# Patient Record
Sex: MALE | Race: WHITE | Employment: OTHER | URBAN - METROPOLITAN AREA
[De-identification: names, ages, dates, MRNs, and addresses within clinical notes are randomized per-mention and may not be internally consistent; named-entity substitution may affect disease eponyms.]

---

## 2022-01-19 ENCOUNTER — TELEPHONE (OUTPATIENT)
Dept: GASTROENTEROLOGY | Age: 82
End: 2022-01-19

## 2022-01-19 ENCOUNTER — HOSPITAL ENCOUNTER (INPATIENT)
Age: 82
LOS: 5 days | Discharge: HOME | DRG: 372 | End: 2022-01-25
Attending: EMERGENCY MEDICINE | Admitting: INTERNAL MEDICINE
Payer: MEDICARE

## 2022-01-19 DIAGNOSIS — A04.72 C. DIFFICILE DIARRHEA: Primary | ICD-10-CM

## 2022-01-19 LAB
ANION GAP SERPL CALCULATED.3IONS-SCNC: 7 MMOL/L (ref 3–16)
ANISOCYTOSIS: ABNORMAL
ATYPICAL LYMPHOCYTE RELATIVE PERCENT: 7 % (ref 0–6)
BANDED NEUTROPHILS RELATIVE PERCENT: 3 % (ref 0–7)
BASOPHILS ABSOLUTE: 0.2 K/UL (ref 0–0.2)
BASOPHILS RELATIVE PERCENT: 1 %
BUN BLDV-MCNC: 28 MG/DL (ref 7–20)
BURR CELLS: ABNORMAL
CALCIUM SERPL-MCNC: 8.8 MG/DL (ref 8.3–10.6)
CHLORIDE BLD-SCNC: 112 MMOL/L (ref 99–110)
CO2: 23 MMOL/L (ref 21–32)
CREAT SERPL-MCNC: 1.6 MG/DL (ref 0.8–1.3)
EOSINOPHILS ABSOLUTE: 0 K/UL (ref 0–0.6)
EOSINOPHILS RELATIVE PERCENT: 0 %
GFR AFRICAN AMERICAN: 51
GFR NON-AFRICAN AMERICAN: 42
GLUCOSE BLD-MCNC: 81 MG/DL (ref 70–99)
HCT VFR BLD CALC: 37.9 % (ref 40.5–52.5)
HEMOGLOBIN: 11.9 G/DL (ref 13.5–17.5)
LYMPHOCYTES ABSOLUTE: 9.7 K/UL (ref 1–5.1)
LYMPHOCYTES RELATIVE PERCENT: 47 %
MACROCYTES: ABNORMAL
MCH RBC QN AUTO: 28.7 PG (ref 26–34)
MCHC RBC AUTO-ENTMCNC: 31.4 G/DL (ref 31–36)
MCV RBC AUTO: 91.3 FL (ref 80–100)
MICROCYTES: ABNORMAL
MONOCYTES ABSOLUTE: 0.9 K/UL (ref 0–1.3)
MONOCYTES RELATIVE PERCENT: 5 %
NEUTROPHILS ABSOLUTE: 7.2 K/UL (ref 1.7–7.7)
NEUTROPHILS RELATIVE PERCENT: 37 %
OVALOCYTES: ABNORMAL
PDW BLD-RTO: 18.4 % (ref 12.4–15.4)
PLATELET # BLD: 167 K/UL (ref 135–450)
PMV BLD AUTO: 8.4 FL (ref 5–10.5)
POIKILOCYTES: ABNORMAL
POLYCHROMASIA: ABNORMAL
POTASSIUM REFLEX MAGNESIUM: 5 MMOL/L (ref 3.5–5.1)
RBC # BLD: 4.15 M/UL (ref 4.2–5.9)
SMUDGE CELLS: PRESENT
SODIUM BLD-SCNC: 142 MMOL/L (ref 136–145)
WBC # BLD: 18 K/UL (ref 4–11)

## 2022-01-19 PROCEDURE — 99284 EMERGENCY DEPT VISIT MOD MDM: CPT

## 2022-01-19 PROCEDURE — 80048 BASIC METABOLIC PNL TOTAL CA: CPT

## 2022-01-19 PROCEDURE — 85025 COMPLETE CBC W/AUTO DIFF WBC: CPT

## 2022-01-19 PROCEDURE — 2580000003 HC RX 258: Performed by: PHYSICIAN ASSISTANT

## 2022-01-19 PROCEDURE — 36415 COLL VENOUS BLD VENIPUNCTURE: CPT

## 2022-01-19 PROCEDURE — 80076 HEPATIC FUNCTION PANEL: CPT

## 2022-01-19 PROCEDURE — 83690 ASSAY OF LIPASE: CPT

## 2022-01-19 RX ORDER — SODIUM CHLORIDE, SODIUM LACTATE, POTASSIUM CHLORIDE, AND CALCIUM CHLORIDE .6; .31; .03; .02 G/100ML; G/100ML; G/100ML; G/100ML
1000 INJECTION, SOLUTION INTRAVENOUS ONCE
Status: COMPLETED | OUTPATIENT
Start: 2022-01-19 | End: 2022-01-20

## 2022-01-19 RX ADMIN — SODIUM CHLORIDE, POTASSIUM CHLORIDE, SODIUM LACTATE AND CALCIUM CHLORIDE 1000 ML: 600; 310; 30; 20 INJECTION, SOLUTION INTRAVENOUS at 23:55

## 2022-01-19 ASSESSMENT — ENCOUNTER SYMPTOMS
NAUSEA: 0
SHORTNESS OF BREATH: 0
ABDOMINAL PAIN: 0
DIARRHEA: 1
RECTAL PAIN: 1
ANAL BLEEDING: 0
VOMITING: 0

## 2022-01-19 NOTE — TELEPHONE ENCOUNTER
This is a 70-year-old man with history of mantle cell lymphoma, GERD, bile salt diarrhea who is suffering from recurrent C. difficile colitis. In the fall, he was having some worsening diarrhea that responded to cholestyramine. However, diarrhea worsened and he was found to be C. difficile PCR positive but EIA negative. We then tried to treat him with 2 cycles of vancomycin, but he had trouble with the 4 times a day dosing. His most recent course of vancomycin ended about 2 weeks ago, and he took only 1 week of 500 mg 4 times a day by accident. Since finishing the vancomycin, the diarrhea certainly worsened and now he is having regular incontinence. I saw him in the office last Friday and he was very discouraged by his incontinence. He went for C. difficile testing at [facility] and was found to be positive by PCR and EIA for C. difficile. I called him this evening to recommend treatment with Dificid and to push the IV fluids but he thinks that he cannot stay home at this point. He reports profuse diarrhea and thinks he is dehydrated and does not think he can keep up with fluids. He wants to go to the ER. Therefore, I recommended he go to [facility] ER.   I did call er to

## 2022-01-20 PROBLEM — A04.71 RECURRENT CLOSTRIDIOIDES DIFFICILE DIARRHEA: Status: ACTIVE | Noted: 2022-01-20

## 2022-01-20 PROBLEM — D72.829 LEUKOCYTOSIS: Status: ACTIVE | Noted: 2022-01-20

## 2022-01-20 PROBLEM — K52.9 COLITIS: Status: ACTIVE | Noted: 2022-01-20

## 2022-01-20 PROBLEM — N17.9 AKI (ACUTE KIDNEY INJURY) (HCC): Status: ACTIVE | Noted: 2022-01-20

## 2022-01-20 LAB
ALBUMIN SERPL-MCNC: 2.3 G/DL (ref 3.4–5)
ALP BLD-CCNC: 94 U/L (ref 40–129)
ALT SERPL-CCNC: 21 U/L (ref 10–40)
ANION GAP SERPL CALCULATED.3IONS-SCNC: 6 MMOL/L (ref 3–16)
AST SERPL-CCNC: 53 U/L (ref 15–37)
BILIRUB SERPL-MCNC: <0.2 MG/DL (ref 0–1)
BILIRUBIN DIRECT: <0.2 MG/DL (ref 0–0.3)
BILIRUBIN, INDIRECT: ABNORMAL MG/DL (ref 0–1)
BUN BLDV-MCNC: 29 MG/DL (ref 7–20)
CALCIUM SERPL-MCNC: 8.6 MG/DL (ref 8.3–10.6)
CHLORIDE BLD-SCNC: 112 MMOL/L (ref 99–110)
CO2: 26 MMOL/L (ref 21–32)
CREAT SERPL-MCNC: 1.7 MG/DL (ref 0.8–1.3)
GFR AFRICAN AMERICAN: 47
GFR NON-AFRICAN AMERICAN: 39
GLUCOSE BLD-MCNC: 109 MG/DL (ref 70–99)
LIPASE: 12 U/L (ref 13–60)
MAGNESIUM: 1.6 MG/DL (ref 1.8–2.4)
POTASSIUM SERPL-SCNC: 4.1 MMOL/L (ref 3.5–5.1)
SODIUM BLD-SCNC: 144 MMOL/L (ref 136–145)
TOTAL PROTEIN: 5.7 G/DL (ref 6.4–8.2)

## 2022-01-20 PROCEDURE — 83735 ASSAY OF MAGNESIUM: CPT

## 2022-01-20 PROCEDURE — 6370000000 HC RX 637 (ALT 250 FOR IP): Performed by: INTERNAL MEDICINE

## 2022-01-20 PROCEDURE — 80048 BASIC METABOLIC PNL TOTAL CA: CPT

## 2022-01-20 PROCEDURE — 1200000000 HC SEMI PRIVATE

## 2022-01-20 PROCEDURE — 6360000002 HC RX W HCPCS: Performed by: INTERNAL MEDICINE

## 2022-01-20 PROCEDURE — 2580000003 HC RX 258: Performed by: INTERNAL MEDICINE

## 2022-01-20 PROCEDURE — 36415 COLL VENOUS BLD VENIPUNCTURE: CPT

## 2022-01-20 PROCEDURE — 6370000000 HC RX 637 (ALT 250 FOR IP): Performed by: PHYSICIAN ASSISTANT

## 2022-01-20 PROCEDURE — 99223 1ST HOSP IP/OBS HIGH 75: CPT | Performed by: INTERNAL MEDICINE

## 2022-01-20 RX ORDER — SODIUM CHLORIDE 9 MG/ML
25 INJECTION, SOLUTION INTRAVENOUS PRN
Status: DISCONTINUED | OUTPATIENT
Start: 2022-01-20 | End: 2022-01-25 | Stop reason: HOSPADM

## 2022-01-20 RX ORDER — ALLOPURINOL 300 MG/1
300 TABLET ORAL NIGHTLY
Status: DISCONTINUED | OUTPATIENT
Start: 2022-01-20 | End: 2022-01-25 | Stop reason: HOSPADM

## 2022-01-20 RX ORDER — ONDANSETRON 4 MG/1
4 TABLET, ORALLY DISINTEGRATING ORAL EVERY 8 HOURS PRN
Status: DISCONTINUED | OUTPATIENT
Start: 2022-01-20 | End: 2022-01-25 | Stop reason: HOSPADM

## 2022-01-20 RX ORDER — POTASSIUM CHLORIDE 20 MEQ/1
40 TABLET, EXTENDED RELEASE ORAL PRN
Status: DISCONTINUED | OUTPATIENT
Start: 2022-01-20 | End: 2022-01-22

## 2022-01-20 RX ORDER — POTASSIUM CHLORIDE 7.45 MG/ML
10 INJECTION INTRAVENOUS PRN
Status: DISCONTINUED | OUTPATIENT
Start: 2022-01-20 | End: 2022-01-22

## 2022-01-20 RX ORDER — LANOLIN ALCOHOL/MO/W.PET/CERES
CREAM (GRAM) TOPICAL PRN
Status: DISCONTINUED | OUTPATIENT
Start: 2022-01-20 | End: 2022-01-25 | Stop reason: HOSPADM

## 2022-01-20 RX ORDER — SODIUM CHLORIDE 0.9 % (FLUSH) 0.9 %
5-40 SYRINGE (ML) INJECTION PRN
Status: DISCONTINUED | OUTPATIENT
Start: 2022-01-20 | End: 2022-01-25 | Stop reason: HOSPADM

## 2022-01-20 RX ORDER — ACETAMINOPHEN 650 MG/1
650 SUPPOSITORY RECTAL EVERY 6 HOURS PRN
Status: DISCONTINUED | OUTPATIENT
Start: 2022-01-20 | End: 2022-01-25 | Stop reason: HOSPADM

## 2022-01-20 RX ORDER — TIMOLOL MALEATE 5 MG/ML
1 SOLUTION/ DROPS OPHTHALMIC 2 TIMES DAILY
COMMUNITY
Start: 2021-10-28

## 2022-01-20 RX ORDER — POLYETHYLENE GLYCOL 3350 17 G/17G
17 POWDER, FOR SOLUTION ORAL DAILY PRN
Status: DISCONTINUED | OUTPATIENT
Start: 2022-01-20 | End: 2022-01-25 | Stop reason: HOSPADM

## 2022-01-20 RX ORDER — ACETAMINOPHEN 325 MG/1
650 TABLET ORAL EVERY 6 HOURS PRN
Status: DISCONTINUED | OUTPATIENT
Start: 2022-01-20 | End: 2022-01-25 | Stop reason: HOSPADM

## 2022-01-20 RX ORDER — SODIUM CHLORIDE 0.9 % (FLUSH) 0.9 %
5-40 SYRINGE (ML) INJECTION EVERY 12 HOURS SCHEDULED
Status: DISCONTINUED | OUTPATIENT
Start: 2022-01-20 | End: 2022-01-25 | Stop reason: HOSPADM

## 2022-01-20 RX ORDER — ONDANSETRON 2 MG/ML
4 INJECTION INTRAMUSCULAR; INTRAVENOUS EVERY 6 HOURS PRN
Status: DISCONTINUED | OUTPATIENT
Start: 2022-01-20 | End: 2022-01-25 | Stop reason: HOSPADM

## 2022-01-20 RX ORDER — UBIDECARENONE 75 MG
50 CAPSULE ORAL EVERY EVENING
Status: DISCONTINUED | OUTPATIENT
Start: 2022-01-20 | End: 2022-01-25 | Stop reason: HOSPADM

## 2022-01-20 RX ORDER — SODIUM CHLORIDE, SODIUM LACTATE, POTASSIUM CHLORIDE, CALCIUM CHLORIDE 600; 310; 30; 20 MG/100ML; MG/100ML; MG/100ML; MG/100ML
INJECTION, SOLUTION INTRAVENOUS CONTINUOUS
Status: ACTIVE | OUTPATIENT
Start: 2022-01-20 | End: 2022-01-20

## 2022-01-20 RX ORDER — ASPIRIN 81 MG/1
81 TABLET ORAL DAILY
Status: DISCONTINUED | OUTPATIENT
Start: 2022-01-20 | End: 2022-01-25 | Stop reason: HOSPADM

## 2022-01-20 RX ORDER — MAGNESIUM SULFATE IN WATER 40 MG/ML
2000 INJECTION, SOLUTION INTRAVENOUS PRN
Status: DISCONTINUED | OUTPATIENT
Start: 2022-01-20 | End: 2022-01-22

## 2022-01-20 RX ORDER — MONTELUKAST SODIUM 4 MG/1
1 TABLET, CHEWABLE ORAL 2 TIMES DAILY
COMMUNITY
End: 2022-02-02

## 2022-01-20 RX ORDER — ATENOLOL 50 MG/1
50 TABLET ORAL NIGHTLY
Status: DISCONTINUED | OUTPATIENT
Start: 2022-01-20 | End: 2022-01-20

## 2022-01-20 RX ORDER — LISINOPRIL 20 MG/1
20 TABLET ORAL DAILY
COMMUNITY
Start: 2021-11-09

## 2022-01-20 RX ADMIN — ASPIRIN 81 MG: 81 TABLET, COATED ORAL at 08:31

## 2022-01-20 RX ADMIN — ALLOPURINOL 300 MG: 300 TABLET ORAL at 02:38

## 2022-01-20 RX ADMIN — Medication 125 MG: at 01:03

## 2022-01-20 RX ADMIN — ENOXAPARIN SODIUM 40 MG: 100 INJECTION SUBCUTANEOUS at 08:30

## 2022-01-20 RX ADMIN — FIDAXOMICIN 200 MG: 200 TABLET, FILM COATED ORAL at 14:15

## 2022-01-20 RX ADMIN — VITAM B12 50 MCG: 100 TAB at 19:15

## 2022-01-20 RX ADMIN — SODIUM CHLORIDE, POTASSIUM CHLORIDE, SODIUM LACTATE AND CALCIUM CHLORIDE: 600; 310; 30; 20 INJECTION, SOLUTION INTRAVENOUS at 12:21

## 2022-01-20 RX ADMIN — ALLOPURINOL 300 MG: 300 TABLET ORAL at 20:53

## 2022-01-20 RX ADMIN — Medication 125 MG: at 08:32

## 2022-01-20 RX ADMIN — SODIUM CHLORIDE, POTASSIUM CHLORIDE, SODIUM LACTATE AND CALCIUM CHLORIDE: 600; 310; 30; 20 INJECTION, SOLUTION INTRAVENOUS at 02:59

## 2022-01-20 RX ADMIN — FIDAXOMICIN 200 MG: 200 TABLET, FILM COATED ORAL at 20:54

## 2022-01-20 NOTE — PROGRESS NOTES
4 Eyes Admission Assessment     I agree as the admission nurse that 2 RN's have performed a thorough Head to Toe Skin Assessment on the patient. ALL assessment sites listed below have been assessed on admission. Areas assessed by both nurses:   [x]   Head, Face, and Ears   [x]   Shoulders, Back, and Chest  [x]   Arms, Elbows, and Hands   [x]   Coccyx, Sacrum, and Ischium  [x]   Legs, Feet, and Heels        Does the Patient have Skin Breakdown?  Patient has redness to BLE and buttocks  Kilo Prevention initiated:  No   Wound Care Orders initiated:  No      [facility] nurse consulted for Pressure Injury (Stage 3,4, Unstageable, DTI, NWPT, and Complex wounds) or Kilo score 18 or lower:  No      Nurse 1 eSignature: Electronically signed by Jer Salcedo RN on 1/20/22 at 5:57 AM EST    **SHARE this note so that the co-signing nurse is able to place an eSignature**    Nurse 2 eSignature: Electronically signed by Jo-Ann Wiley RN on 1/20/22 at 7:46 AM EST

## 2022-01-20 NOTE — H&P
Hospital Medicine History & Physical      PCP: Linda Wong MD    Date of Admission: 1/19/2022    Date of Service: Pt seen/examined on 1/20/2022 and Admitted to Inpatient with expected LOS greater than two midnights due to medical therapy. Chief Complaint: Refractory diarrhea (C. difficile colitis)      History Of Present Illness:      80 y.o. male who presents with complaints of persistent diarrhea over 6 weeks. Patient has been diagnosed with C. difficile infection 6 weeks ago. Patient follows with his GI doctor who put him on oral vancomycin and patient has been having some difficulty with compliance of 4 times daily dosing. Patient has been taking only 1-2 times per day instead of 4 times a day. Patient has experienced increased episodes of diarrhea recently with lot of anal/rectal discomfort without worsening abdominal pain. Patient denies fever, chills, blood in his stools. He was was tested again for C. difficile infection which came positive last Friday at an outside hospital.  Patient also complains that he has not had anything by mouth for the past 36 hours to avoid having any bowel movements. He has been having significant fecal incontinence as well. Patient lives by himself and has been feeling extremely weak to the point where he is unable to cook his meals without resting frequently. He was advised to come to the ER by his GI doctor. Patient has no history of recent antibiotic use or hospitalizations.     Has history of mantle cell lymphoma for which he takes chemotherapy and follows with oncology at [facility]      Past Medical History:          Diagnosis Date    Acid reflux     Anesthesia complication     severe sore throat after last 2 surgeries    Bladder stone     current    BPH (benign prostatic hyperplasia)     BPH (benign prostatic hypertrophy) with urinary obstruction 12/10/2013    H/O arthroscopy of left knee     x3    History of arthroscopy of right knee     x3    Hypertension     Kidney stones     pt states has had 33 kidney stones    Sleep apnea     mild no c-pap needed       Past Surgical History:          Procedure Laterality Date    CHOLECYSTECTOMY      lap choley    COLONOSCOPY      HERNIA REPAIR  laporoscopic    umbilical with mesh    KNEE ARTHROSCOPY Bilateral     right x 3,left x3    PROSTATECTOMY N/A 12/10/13    suprapubic prostatectomy/open bladder stone removal       Medications Prior to Admission:      Prior to Admission medications    Medication Sig Start Date End Date Taking? Authorizing Provider   KRILL OIL PO Take 2 tablets by mouth every evening. Historical Provider, MD   oxyCODONE-acetaminophen (ROXICET) 5-325 MG per tablet Take 1 tablet by mouth every 4 hours as needed. Historical Provider, MD   phenazopyridine (PYRIDIUM) 200 MG tablet Take 200 mg by mouth three times daily. Historical Provider, MD   omeprazole (PRILOSEC) 20 MG capsule Take 20 mg by mouth every evening. Historical Provider, MD   aspirin 81 MG tablet Take 81 mg by mouth daily. Stopped for surgery    Historical Provider, MD   multivitamin [facility]) per tablet Take 1 tablet by mouth every evening. Historical Provider, MD   GARLIC Take 1 tablet by mouth every evening. Historical Provider, MD   vitamin B-12 (CYANOCOBALAMIN) 100 MCG tablet Take 50 mcg by mouth every evening. Historical Provider, MD   Coral Calcium 500 MG TABS Take 1 tablet by mouth daily. Historical Provider, MD   niacin 125 MG CR capsule Take 500 mg by mouth every evening. Historical Provider, MD   atenolol (TENORMIN) 50 MG tablet Take 50 mg by mouth nightly. Historical Provider, MD   allopurinol (ZYLOPRIM) 300 MG tablet Take 300 mg by mouth nightly. Historical Provider, MD   lisinopril (PRINIVIL;ZESTRIL) 10 MG tablet Take 10 mg by mouth every evening. Historical Provider, MD   clonazepam (KLONOPIN) 0.5 MG tablet Take 0.5 mg by mouth as needed.       Historical Provider, MD   Coenzyme Q10 (CO Q 10) 10 MG CAPS Take 1 capsule by mouth every evening. Historical Provider, MD   Glucosamine-Chondroit-Vit C-Mn (GLUCOSAMINE 1500 COMPLEX PO) Take 1 capsule by mouth every evening. Historical Provider, MD   Omega-3 Fatty Acids (FISH OIL) 1000 MG CAPS Take 3,000 mg by mouth every evening. Stopped for surgery    Historical Provider, MD   zinc gluconate 50 MG tablet Take 50 mg by mouth as needed. Historical Provider, MD   Echinacea 380 MG CAPS Take 1 capsule by mouth as needed. Historical Provider, MD   Lysine 500 MG TABS Take 1 tablet by mouth as needed. Historical Provider, MD       Allergies:  Stadol [butorphanol]    Social History:    TOBACCO:   reports that he has never smoked. He has never used smokeless tobacco.  ETOH:   reports current alcohol use of about 0.8 standard drinks of alcohol per week. E-Cigarettes/Vaping Use     Questions Responses    E-Cigarette/Vaping Use     Start Date     Passive Exposure     Quit Date     Counseling Given     Comments         Family History:    Reviewed in detail and negative for DM, CAD, Cancer, CVA. REVIEW OF SYSTEMS COMPLETED:   Pertinent positives as noted in the HPI. All other systems reviewed and negative. PHYSICAL EXAM PERFORMED:    /78   Pulse 102   Temp 98.3 °F (36.8 °C) (Oral)   Resp 16   SpO2 95%     General appearance:  No apparent distress, appears stated age and cooperative. HEENT:  Normal cephalic, atraumatic without obvious deformity. Pupils equal, round, and reactive to light. Extra ocular muscles intact. Conjunctivae/corneas clear. Neck: Supple, with full range of motion. No jugular venous distention. Trachea midline. Respiratory:  Normal respiratory effort. Clear to auscultation, bilaterally without Rales/Wheezes/Rhonchi. Cardiovascular: Tachycardic rate and rhythm with normal S1/S2 without murmurs, rubs or gallops.   Abdomen: Soft, non-tender, non-distended with normal bowel you have any questions or concerns please feel free to contact me at [phone].

## 2022-01-20 NOTE — CONSULTS
Consult Note     Patient: Magdi Tapia MRN: [id number]   YOB: 1940 Age: 80 y.o. Sex: male   Unit: [address] [facility] Room/Bed: 1293/2248-15 Location: [address] [place] [address]    Admitting Physician: Kael Cosme    Primary Care Physician: Aj Katz MD   Admission Date: 1/19/2022   Consult Date: 1/20/2022     Assessment/Plan:    Active Problems:    Colitis  Resolved Problems:    * No resolved hospital problems. *     Assessment:  -Recurrent C. difficile colitis on a background of bile salt diarrhea. By definition, this is severe C. difficile colitis with a white blood cell count of 18,000 and some acute kidney injury. He is failed vancomycin, but there was some trouble with compliance given the dosing schedule.   -Acute kidney injury, likely on the basis of dehydration. Plan:  -would change vancomycin to dificid 200mg bid x 10 days. -IVF  -regular diet.   -if he fails dificid or doesn't respond, would consider FMT. Subjective:    History of Present Illness: Magdi Tapia is a 80 y.o. male who is seen in consultation at the request of Kael Cosme for recurrent c diff colitis. He has a history of mantle cell lymphoma, GERD, bile salt diarrhea who is suffering from recurrent C. difficile colitis. In the fall, he was having worsening diarrhea that responded to cholestyramine however diarrhea worsened he was found to be C. difficile positive by PCR but EIA negative. We initially treated him with vancomycin 125 4 times a day for 2 weeks however he found it challenging to take the dosing on a regular basis and diarrhea did not improve. We then attempted to retreat him, but he accidentally took vancomycin 500 mg 4 times a day for 7 days rather than the intended to 54 times a day for 2 weeks.   He finished this about a week and a half ago and had noted that his diarrhea improved very little during the time he was on the antibiotic and certainly worsened when he finished it. I saw him a week ago in the office where he was reporting constant diarrhea and incontinence of stool. Repeat C. difficile testing was PCR and EIA positive. Speaking to him over the phone with results last night, he insisted on coming in because of ongoing profuse diarrhea. Today, he mentions the diarrhea is better but still urgent with incontinence, 2 bowel movements so far today. He has ongoing anal pain, which has been associated with his diarrhea these last 2 months. WBC 18,000, up from his baseline of around 8000. Hemoglobin 12. Creatinine 1.6 (baseline 1.2 last month)      Review of Systems:    Constitutional: Negative for fever, chills, fatigue and unexpected weight change. HENT: Negative for trouble swallowing. Respiratory: Negative for cough, chest tightness and shortness of breath. Cardiovascular: Negative for chest pain and leg swelling. Gastrointestinal: see hpi  Neurological: Negative for seizures, syncope and headaches. Hematological: Does not bruise/bleed easily.      Past Medical History:   Diagnosis Date    Acid reflux     Anesthesia complication     severe sore throat after last 2 surgeries    Bladder stone     current    BPH (benign prostatic hyperplasia)     BPH (benign prostatic hypertrophy) with urinary obstruction 12/10/2013    H/O arthroscopy of left knee     x3    History of arthroscopy of right knee     x3    Hypertension     Kidney stones     pt states has had 33 kidney stones    Sleep apnea     mild no c-pap needed     Past Surgical History:   Procedure Laterality Date    CHOLECYSTECTOMY      lap choley    COLONOSCOPY      HERNIA REPAIR  laporoscopic    umbilical with mesh    KNEE ARTHROSCOPY Bilateral     right x 3,left x3    PROSTATECTOMY N/A 12/10/13    suprapubic prostatectomy/open bladder stone removal     Allergies   Allergen Reactions    Stadol [Butorphanol] Other (See Comments)     Shakes,tremors-severe Prior to Admission medications    Medication Sig Start Date End Date Taking? Authorizing Provider   Coral Calcium 500 MG TABS Take 1 tablet by mouth daily. Yes Historical Provider, MD   KRILL OIL PO Take 2 tablets by mouth every evening. Historical Provider, MD   oxyCODONE-acetaminophen (ROXICET) 5-325 MG per tablet Take 1 tablet by mouth every 4 hours as needed. Historical Provider, MD   phenazopyridine (PYRIDIUM) 200 MG tablet Take 200 mg by mouth three times daily. Historical Provider, MD   omeprazole (PRILOSEC) 20 MG capsule Take 20 mg by mouth every evening. Historical Provider, MD   aspirin 81 MG tablet Take 81 mg by mouth daily. Stopped for surgery    Historical Provider, MD   multivitamin [facility]) per tablet Take 1 tablet by mouth every evening. Historical Provider, MD   GARLIC Take 1 tablet by mouth every evening. Historical Provider, MD   vitamin B-12 (CYANOCOBALAMIN) 100 MCG tablet Take 50 mcg by mouth every evening. Historical Provider, MD   niacin 125 MG CR capsule Take 500 mg by mouth every evening. Historical Provider, MD   atenolol (TENORMIN) 50 MG tablet Take 50 mg by mouth nightly. Historical Provider, MD   allopurinol (ZYLOPRIM) 300 MG tablet Take 300 mg by mouth nightly. Historical Provider, MD   lisinopril (PRINIVIL;ZESTRIL) 10 MG tablet Take 10 mg by mouth every evening. Historical Provider, MD   clonazepam (KLONOPIN) 0.5 MG tablet Take 0.5 mg by mouth as needed. Historical Provider, MD   Coenzyme Q10 (CO Q 10) 10 MG CAPS Take 1 capsule by mouth every evening. Historical Provider, MD   Glucosamine-Chondroit-Vit C-Mn (GLUCOSAMINE 1500 COMPLEX PO) Take 1 capsule by mouth every evening. Historical Provider, MD   Omega-3 Fatty Acids (FISH OIL) 1000 MG CAPS Take 3,000 mg by mouth every evening. Stopped for surgery    Historical Provider, MD   zinc gluconate 50 MG tablet Take 50 mg by mouth as needed.     Historical Provider, MD   Echinacea 380 MG CAPS Take 1 capsule by mouth as needed. Historical Provider, MD   Lysine 500 MG TABS Take 1 tablet by mouth as needed. Historical Provider, MD      Scheduled Meds:   allopurinol  300 mg Oral Nightly    aspirin  81 mg Oral Daily    vitamin B-12  50 mcg Oral QPM    sodium chloride flush  5-40 mL IntraVENous 2 times per day    enoxaparin  40 mg SubCUTAneous Daily    vancomycin  125 mg Oral 4 times per day     Continuous Infusions:   sodium chloride      lactated ringers 100 mL/hr at 01/20/22 0259     PRN Meds:sodium chloride flush, sodium chloride, ondansetron **OR** ondansetron, polyethylene glycol, acetaminophen **OR** acetaminophen, potassium chloride **OR** potassium alternative oral replacement **OR** potassium chloride, magnesium sulfateHistory reviewed. No pertinent family history. Social History     Tobacco Use    Smoking status: Never Smoker    Smokeless tobacco: Never Used   Substance Use Topics    Alcohol use: Yes     Alcohol/week: 0.8 standard drinks     Types: 1 drink(s) per week     Comment: occas       Objective:    Physical Exam:  /85   Pulse 103   Temp 97.8 °F (36.6 °C) (Oral)   Resp 18   Ht 6' 0.01\" (1.829 m)   Wt 214 lb 15.2 oz (97.5 kg)   SpO2 93%   BMI 29.15 kg/m²    General:  comfortable  Heent: There is no scleral icterus. The oropharynx is clear. The neck is supple without masses  Cardiovascular: The heart is regular rate and rhythm. Respiratory:  The patient's breathing is non-labored with normal chest wall excursion and normal muscle movement. Abdomen: The abdomen is distended, soft, and nontender. Rectal:  deferred. Extremities:  No edema. Neurological:  Gross memory appears intact. Patient is alert and oriented. Labs and Imaging revewed.         Signed By: Julita Mitchell MD   January 20, 2022

## 2022-01-20 NOTE — CONSULTS
Infectious Diseases Inpatient Consult Note    Medical Student note - reviewed and modified, see Attending addendum at bottom    Reason for Consult:   C difficile colitis  Requesting Physician:   Dr. Alon Mtz  Primary Care Physician:  Herrera Lindsey MD  History Obtained From:   Pt, EPIC    Admit Date: 1/19/2022  Hospital Day: 2    CHIEF COMPLAINT:       Chief Complaint   Patient presents with    Other     cdiff       HISTORY OF PRESENT ILLNESS:      Jeanne Olvera is a 80year old male with a PMH of HTN, mantle cell lymphoma, who presents to the ED with diarrhea for the past 6 weeks. He has been followed by GI and has attempted taking vancomycin dosed 4 times per day but has only taken 1-2 times per day. Initial C diff diagnosis on 11/22/21. He also had + C diff toxin 12/28/21.       [facility]18/22 [facility]    ED workup on 1/19 demonstrated WBC count 18, creatinine 1.6  Today patient is alert and oriented  Currently denies cp. sob, nausea, vomiting      Past Medical History:    Past Medical History:   Diagnosis Date    Acid reflux     Anesthesia complication     severe sore throat after last 2 surgeries    Bladder stone     current    BPH (benign prostatic hyperplasia)     BPH (benign prostatic hypertrophy) with urinary obstruction 12/10/2013    H/O arthroscopy of left knee     x3    History of arthroscopy of right knee     x3    Hypertension     Kidney stones     pt states has had 33 kidney stones    Sleep apnea     mild no c-pap needed       Past Surgical History:    Past Surgical History:   Procedure Laterality Date    CHOLECYSTECTOMY      lap choley    COLONOSCOPY      HERNIA REPAIR  laporoscopic    umbilical with mesh    KNEE ARTHROSCOPY Bilateral     right x 3,left x3    PROSTATECTOMY N/A 12/10/13    suprapubic prostatectomy/open bladder stone removal       Current Medications:     allopurinol  300 mg Oral Nightly    aspirin  81 mg Oral Daily    vitamin B-12  50 mcg Oral QPM    sodium chloride flush  5-40 mL IntraVENous 2 times per day    enoxaparin  40 mg SubCUTAneous Daily    vancomycin  125 mg Oral 4 times per day       Allergies:  Stadol [butorphanol]    Social History:    TOBACCO:    Never  ETOH:    .8 drinks per week  DRUGS:   No  MARITAL STATUS:     OCCUPATION:   NA      Family History:   No immunodeficiency    REVIEW OF SYSTEMS:    No fever / chills / sweats. No weight loss. No visual change, eye pain, eye discharge. No oral lesion, sore throat, dysphagia. Denies cough / sputum. Denies chest pain, palpitations. Denies n / v / abd pain. diarrhea. Denies dysuria or change in urinary function. Denies joint swelling or pain. No myalgia, arthralgia. Denies skin changes, itching  Denies focal weakness, sensory change or other neurologic symptom    Denies new / worse depression, psychiatric symptoms    PHYSICAL EXAM:      Vitals:    /85   Pulse 103   Temp 97.8 °F (36.6 °C) (Oral)   Resp 18   Ht 6' 0.01\" (1.829 m)   Wt 214 lb 15.2 oz (97.5 kg)   SpO2 93%   BMI 29.15 kg/m²     GENERAL: No apparent distress.     HEENT: Membranes moist, no oral lesion, PERRL  NECK:  Supple, no lymphadenopathy  LUNGS: Clear b/l, no rales, no dullness  CARDIAC: RRR, no murmur appreciated  ABD:  + BS, soft / NT  EXT:  No rash, no edema, no lesions  NEURO: No focal neurologic findings  PSYCH: Orientation, sensorium, mood normal  LINES:  Peripheral iv    DATA:    Lab Results   Component Value Date    WBC 18.0 (H) 01/19/2022    HGB 11.9 (L) 01/19/2022    HCT 37.9 (L) 01/19/2022    MCV 91.3 01/19/2022     01/19/2022     Lab Results   Component Value Date    CREATININE 1.6 (H) 01/19/2022    BUN 28 (H) 01/19/2022     01/19/2022    K 5.0 01/19/2022     (H) 01/19/2022    CO2 23 01/19/2022       Hepatic Function Panel:   Lab Results   Component Value Date    ALKPHOS 94 01/19/2022    ALT 21 01/19/2022    AST 53 01/19/2022    PROT 5.7 01/19/2022    BILITOT <0.2 01/19/2022    BILIDIR <0.2 01/19/2022    IBILI see below 01/19/2022    LABALBU 2.3 01/19/2022       Micro:  None    11/22, 12/28, 1/18/22 C diff positive [facility]]    Imaging:   None    IMPRESSION:      Patient Active Problem List   Diagnosis    Essential hypertension, benign    Mitral valve disorder    Nonspecific abnormal results of cardiovascular function study    Benign prostatic hyperplasia with urinary obstruction    Colitis       C. Difficile colitis - noncompliant with home antibiotics     Mantle cell lymphoma  Hypertension    RECOMMENDATIONS:  -Continue on vancomycin 125mg p.o. every 6 hours  -IV hydration  -Contact isolation    Discussed with Dr. Issac Rojo    Addendum to Medical Student Consult note:  Pt seen,examined and evaluated. I have independently performed history, physical exam, lab and data review. I have determined assessment and plan as documented by student Roxana Cr). 79 yo man with hx HTN, mantle cell lymphoma, nephrolithiasis, FAM    Pt has hx C diff, dx 11/22, 12/28, 1/18/22  Onset diarrhea, mult episodes, loose stool in early November   No prior antibiotics by pt hx. No blood, no fever. Seen, + test 11/22, rx po vancomycin, did not take vancomycin as prescribed (had to travel to appointment and concerned by having BM). Completed vanco in early December. Pt reports he never had improvement in number and character of stool. Retested in 12/28, C diff pos, restarted po vanco and this time, he took med 5x/d. Again, no improvement. He called GI (followed by  Dr Wilson Warner) and referred to [facility]    In ED 1/19, afeb, WBC 18, Cr 1.6  Admit and started on po vancomycin 125 mg qid    Today 1/20, denies pain    IMP/  Recurrent, non-resolving C diff  DORINDA, leukocytosis    REC/  Start fidaxomicin 200 bid x 10 d  Add pyllium  Await GI input  IVF, electrolytes, renal per Medical team    Medical Decision Making:   The following items were considered in medical decision making:  Discussion of patient care with other providers  Reviewed clinical lab tests  Reviewed radiology tests  Reviewed other diagnostic tests/interventions  Independent review of radiologic images  Microbiology cultures and other micro tests reviewed      Risk of Complications/Morbidity: High   Illness(es)/ Infection present that pose threat to bodily function. There is potential for severe exacerbation of infection/side effects of treatment.   Therapy requires intensive monitoring for antimicrobial agent toxicity    Discussed with pt  Dolly Holly MD

## 2022-01-20 NOTE — PROGRESS NOTES
Patient admitted by colleague earlier this morning. 79 yo M with PMH of HTN, mantle cell lymphoma, who presents with recent C. Diff diagnosis, ongoing diarrhea, and difficulty taking his home medications. Has been feeling very weak and dehydrated. Has had decreased PO intake for several days as well. He was found to have DORINDA, leukocytosis. He was started on IVFs. GI and ID were consulted. Patient reports he is feeling better and has had more food than he has had in some time, was not able to take care of himself at home. Severe C.  Diff Colitis  Mild DORINDA on CKD (baseline 1.4-1.5 per PCP)  Patient having difficulty taking vancomycin as prescribed outpatient  Appreciate ID, GI involvement  Continue IV fluids with LR for now  Check BMP and Mg this evening    Cece Nayak DO  Hospitalist

## 2022-01-20 NOTE — ED PROVIDER NOTES
[address] [facility]          PHYSICIAN ASSISTANT NOTE       Date of evaluation: 1/19/2022    Chief Complaint     Other (cdiff)      History of Present Illness     HPI: Koby Serrano is a 80 y.o. male with history of hypertension, mantle cell lymphoma who presents to the emergency department with diarrhea. Patient has had intermittent C. difficile and diarrhea for the past 6 weeks. He has been followed by gastroenterology. He is attempted taking the vancomycin, though this has been dosed 4 times per day and he has had difficulty with compliance of this. Instead he has only been able to take it typically 1-2 times per day. Recently he has been having increased recurrence of his diarrhea. He is experiencing a lot of anal discomfort, though denies any increased abdominal pain. He has not had any fevers or chills. He reached out to his gastroenterologist and had repeat stool samples performed at outside hospital on Friday. He denies any urinary symptoms. Patient is concerned as he lives at home by himself, has not ate or drank anything in the past 30 hours to avoid having any bowel movements as his urgency has led him to have frequent fecal incontinence as well. He also has been weak to the point where when he is cooking in the kitchen he brings his stools that way he is able to sit every few minutes. He was told to come into the hospital by his gastroenterology team.    With the exception of the above, there are no aggravating or alleviating factors. Review of Systems     Review of Systems   Constitutional: Negative for chills and fever. Respiratory: Negative for shortness of breath. Cardiovascular: Negative for chest pain. Gastrointestinal: Positive for diarrhea and rectal pain. Negative for abdominal pain, anal bleeding, nausea and vomiting. Genitourinary: Negative for dysuria and frequency. Neurological: Negative for dizziness and headaches.      As stated above, all other systems reviewed and are otherwise negative. Past Medical, Surgical, Family, and Social History     He has a past medical history of Acid reflux, Anesthesia complication, Bladder stone, BPH (benign prostatic hyperplasia), BPH (benign prostatic hypertrophy) with urinary obstruction, H/O arthroscopy of left knee, History of arthroscopy of right knee, Hypertension, Kidney stones, and Sleep apnea. He has a past surgical history that includes Knee arthroscopy (Bilateral); Cholecystectomy; hernia repair (laporoscopic); Colonoscopy; and Prostatectomy (N/A, 12/10/13). His family history is not on file. He reports that he has never smoked. He has never used smokeless tobacco. He reports current alcohol use of about 0.8 standard drinks of alcohol per week. He reports that he does not use drugs. Medications     Previous Medications    ALLOPURINOL (ZYLOPRIM) 300 MG TABLET    Take 300 mg by mouth nightly. ASPIRIN 81 MG TABLET    Take 81 mg by mouth daily. Stopped for surgery    ATENOLOL (TENORMIN) 50 MG TABLET    Take 50 mg by mouth nightly. CLONAZEPAM (KLONOPIN) 0.5 MG TABLET    Take 0.5 mg by mouth as needed. COENZYME Q10 (CO Q 10) 10 MG CAPS    Take 1 capsule by mouth every evening. CORAL CALCIUM 500 MG TABS    Take 1 tablet by mouth daily. ECHINACEA 380 MG CAPS    Take 1 capsule by mouth as needed. GARLIC    Take 1 tablet by mouth every evening. GLUCOSAMINE-CHONDROIT-VIT C-MN (GLUCOSAMINE 1500 COMPLEX PO)    Take 1 capsule by mouth every evening. KRILL OIL PO    Take 2 tablets by mouth every evening. LISINOPRIL (PRINIVIL;ZESTRIL) 10 MG TABLET    Take 10 mg by mouth every evening. LYSINE 500 MG TABS    Take 1 tablet by mouth as needed. MULTIVITAMIN (THERAGRAN) PER TABLET    Take 1 tablet by mouth every evening. NIACIN 125 MG CR CAPSULE    Take 500 mg by mouth every evening.     OMEGA-3 FATTY ACIDS (FISH OIL) 1000 MG CAPS    Take 3,000 mg by mouth every evening. Stopped for surgery    OMEPRAZOLE (PRILOSEC) 20 MG CAPSULE    Take 20 mg by mouth every evening. OXYCODONE-ACETAMINOPHEN (ROXICET) 5-325 MG PER TABLET    Take 1 tablet by mouth every 4 hours as needed. PHENAZOPYRIDINE (PYRIDIUM) 200 MG TABLET    Take 200 mg by mouth three times daily. VITAMIN B-12 (CYANOCOBALAMIN) 100 MCG TABLET    Take 50 mcg by mouth every evening. ZINC GLUCONATE 50 MG TABLET    Take 50 mg by mouth as needed. Allergies     He is allergic to stadol [butorphanol]. Physical Exam     INITIAL VITALS: BP: 135/76, Temp: 98.3 °F (36.8 °C), Pulse: 106, Resp: 18, SpO2: 95 %  Physical Exam  Vitals and nursing note reviewed. Constitutional:       General: He is not in acute distress. Appearance: Normal appearance. He is normal weight. He is not ill-appearing, toxic-appearing or diaphoretic. HENT:      Head: Normocephalic and atraumatic. Right Ear: External ear normal.      Left Ear: External ear normal.      Nose: Nose normal.      Mouth/Throat:      Mouth: Mucous membranes are moist.      Pharynx: Oropharynx is clear. Eyes:      Extraocular Movements: Extraocular movements intact. Conjunctiva/sclera: Conjunctivae normal.   Cardiovascular:      Rate and Rhythm: Normal rate. Pulses: Normal pulses. Pulmonary:      Effort: Pulmonary effort is normal. No respiratory distress. Abdominal:      General: Abdomen is flat. There is no distension. Palpations: Abdomen is soft. Tenderness: There is no abdominal tenderness. There is no guarding or rebound. Musculoskeletal:         General: Normal range of motion. Cervical back: Normal range of motion. Skin:     General: Skin is warm and dry. Neurological:      General: No focal deficit present. Mental Status: He is alert. Mental status is at baseline.    Psychiatric:         Mood and Affect: Mood normal.         Behavior: Behavior normal.       Diagnostic Results     RADIOLOGY:  No orders to display     LABS:   Results for orders placed or performed during the hospital encounter of 01/19/22   CBC auto differential   Result Value Ref Range    WBC 18.0 (H) 4.0 - 11.0 K/uL    RBC 4.15 (L) 4.20 - 5.90 M/uL    Hemoglobin 11.9 (L) 13.5 - 17.5 g/dL    Hematocrit 37.9 (L) 40.5 - 52.5 %    MCV 91.3 80.0 - 100.0 fL    MCH 28.7 26.0 - 34.0 pg    MCHC 31.4 31.0 - 36.0 g/dL    RDW 18.4 (H) 12.4 - 15.4 %    Platelets 502 514 - 590 K/uL    MPV 8.4 5.0 - 10.5 fL    Neutrophils % 37.0 %    Lymphocytes % 47.0 %    Monocytes % 5.0 %    Eosinophils % 0.0 %    Basophils % 1.0 %    Neutrophils Absolute 7.2 1.7 - 7.7 K/uL    Lymphocytes Absolute 9.7 (H) 1.0 - 5.1 K/uL    Monocytes Absolute 0.9 0.0 - 1.3 K/uL    Eosinophils Absolute 0.0 0.0 - 0.6 K/uL    Basophils Absolute 0.2 0.0 - 0.2 K/uL    Bands Relative 3 0 - 7 %    Atypical Lymphocytes Relative 7 (H) 0 - 6 %    Smudge Cells Present (A)     Anisocytosis 1+ (A)     Macrocytes Occasional (A)     Microcytes 1+ (A)     Polychromasia Occasional (A)     Poikilocytes Occasional (A)     Raywick Cells Occasional (A)     Ovalocytes 1+ (A)    Basic Metabolic Panel w/ Reflex to MG   Result Value Ref Range    Sodium 142 136 - 145 mmol/L    Potassium reflex Magnesium 5.0 3.5 - 5.1 mmol/L    Chloride 112 (H) 99 - 110 mmol/L    CO2 23 21 - 32 mmol/L    Anion Gap 7 3 - 16    Glucose 81 70 - 99 mg/dL    BUN 28 (H) 7 - 20 mg/dL    CREATININE 1.6 (H) 0.8 - 1.3 mg/dL    GFR Non-African American 42 (A) >60    GFR  51 (A) >60    Calcium 8.8 8.3 - 10.6 mg/dL     RECENT VITALS:  BP: 126/80, Temp: 98.3 °F (36.8 °C), Pulse: 103, Resp: 18, SpO2: 96 %     Procedures     n/a    ED Course     Nursing Notes, Past Medical Hx,Past Surgical Hx, Social Hx, Allergies, and Family Hx were reviewed.     The patient was given the following medications:  Orders Placed This Encounter   Medications    lactated ringers bolus    vancomycin (VANCOCIN) oral solution 125 mg     Order Specific Question: Antimicrobial Indications     Answer:   Infectious Diarrhea       CONSULTS:  IP CONSULT TO HOSPITALIST    MEDICAL DECISION MAKING / ASSESSMENT / PLAN     Vitals:    01/19/22 2042 01/19/22 2233 01/19/22 2358   BP: 135/76 (!) 141/79 126/80   Pulse: 106 105 103   Resp: 18 17 18   Temp: 98.3 °F (36.8 °C)     TempSrc: Oral     SpO2: 95% 95% 96%       Elizabeth Hdz is a 80 y.o. male who presents to the emergency department with persistent diarrhea. Patient was initially diagnosed with C. difficile 6 weeks ago. He has had difficulty with compliance of 4 times daily oral vancomycin. He has had increased diarrhea and weakness over the past several days. He was told to come into the emergency department for evaluation by his gastroenterology team in the setting of these worsening symptoms. Upon arrival patient is mildly tachycardic though otherwise hemodynamically stable. Patient had triage labs performed prior to being roomed, has a leukocytosis of 18 and with a mild increased creatinine of 1.6 and BUN of 28. Patient's other electrolytes are unremarkable in the setting of his diarrhea. On chart review patient's gastroenterologist, Dr. Diane Gracia, placed an extensive note. Patient had worsening diarrhea starting in the fall that responded to cholestyramine. He then tested positive for C. difficile. He tried 2 cycles of vancomycin at that time but was having difficulty with compliance. He finished this most recently 2 weeks ago. Given patient's increased weakness and failed outpatient therapy for his recurrent C. difficile I do feel that admission to the hospital is the best plan. Patient was given IV fluids here in the emergency department. I did discuss the option of obtaining a CT scan of the patient's abdomen with the hospitalist, ultimately decided to defer based on his benign abdominal exam and known C. difficile colitis.   I spoke with pharmacy who recommended reinitiating vancomycin given that the patient never truly failed this therapy. Hospitalist will also consult infectious disease for their recommendations. At this point in time, patient will require admission to the hospital for further management of their clinical condition. I spoke with the admitting team who is in agreement with plan for admission. Patient and family are in agreement with plan for admission. Patient will be cared for in the ED prior to a bed becoming available upstairs. The patient was evaluated by myself and the ED Attending Physician, Dr. Robin Belle. All management and disposition plans were discussed and agreed upon. Clinical Impression     1. C. difficile diarrhea      This note was dictated using voice-recognition software, which occasionally leads to inadvertent typographic errors.     Disposition     DISPOSITION Decision To Admit 01/19/2022 11:19:45 PM     PAULINA Baca  01/19/22 7202

## 2022-01-20 NOTE — ED NOTES
Report given to Jhoan Hernandez, RN 5S. Questions answered, care transferred. Pt off unit via stretcher in NAD, RR even and unlabored.  VSS>      Marlen Tijerina RN  01/20/22 8082

## 2022-01-20 NOTE — PROGRESS NOTES
Patient alert and oriented came from ED and oriented to his room 3393. VSS Patient denies any pain or nausea. Assessment done. see flowsheet. Patient in bed lowest position call light and bedside table within reach. All needs are met at this time. Patient aware to call if any help needed. Will continue to monitor

## 2022-01-20 NOTE — PROGRESS NOTES
Physician Progress Note      PATIENT:               Glory Santiago  CSN #:                  [id number]  :                       1940  ADMIT DATE:       2022 10:24 PM  DISCH DATE:  RESPONDING  PROVIDER #:        CAM JEAN DO          QUERY TEXT:    Patient admitted with C-diff, noted to have WBC: 18.0 and HR: 102. If   possible, please document in progress notes and discharge summary if you are   evaluating and/or treating any of the following: The medical record reflects the following:  Risk Factors: 80 y.o. male w/C-diff, BPH, HTN, Sleep apnea, Kidney stones,   mantle cell lymphoma  Clinical Indicators:  WBC: 18.0 and HR: 102. w/C-diff infection  Treatment: Vanc, gentle IV Hydration  Options provided:  -- Sepsis, present on admission  -- Sepsis was ruled out  -- Other - I will add my own diagnosis  -- Disagree - Not applicable / Not valid  -- Disagree - Clinically unable to determine / Unknown  -- Refer to Clinical Documentation Reviewer    PROVIDER RESPONSE TEXT:    This patient has sepsis which was likely present on admission.     Query created by: Mesfin Bustamante on 2022 11:41 AM      Electronically signed by:  Jyoti Hartman DO 2022 3:40 PM

## 2022-01-20 NOTE — ED PROVIDER NOTES
ED Attending Attestation Note     Date of evaluation: 1/19/2022    This patient was seen by the advance practice provider. I have seen and examined the patient, agree with the workup, evaluation, management and diagnosis. The care plan has been discussed. My assessment reveals patient with history of recurrent C. difficile presents complaining of worsening diarrhea. Patient has a soft abdomen on exam but is mildly tachycardic. Will check laboratory studies.       Maryann Myers MD  01/19/22 6476

## 2022-01-20 NOTE — ED TRIAGE NOTES
Pt states that he was told by his doctor that he has cdiff. Pt states that he hasn't had anything to eat or drink for 36 hours trying to stop the diarrhea.

## 2022-01-21 LAB
ANION GAP SERPL CALCULATED.3IONS-SCNC: 5 MMOL/L (ref 3–16)
BASOPHILS ABSOLUTE: 0.1 K/UL (ref 0–0.2)
BASOPHILS RELATIVE PERCENT: 0.5 %
BUN BLDV-MCNC: 25 MG/DL (ref 7–20)
CALCIUM SERPL-MCNC: 9 MG/DL (ref 8.3–10.6)
CHLORIDE BLD-SCNC: 113 MMOL/L (ref 99–110)
CO2: 25 MMOL/L (ref 21–32)
CREAT SERPL-MCNC: 1.4 MG/DL (ref 0.8–1.3)
EOSINOPHILS ABSOLUTE: 0.1 K/UL (ref 0–0.6)
EOSINOPHILS RELATIVE PERCENT: 0.3 %
GFR AFRICAN AMERICAN: 59
GFR NON-AFRICAN AMERICAN: 49
GLUCOSE BLD-MCNC: 115 MG/DL (ref 70–99)
HCT VFR BLD CALC: 36.2 % (ref 40.5–52.5)
HEMOGLOBIN: 11.5 G/DL (ref 13.5–17.5)
LYMPHOCYTES ABSOLUTE: 18.6 K/UL (ref 1–5.1)
LYMPHOCYTES RELATIVE PERCENT: 86.8 %
MCH RBC QN AUTO: 29 PG (ref 26–34)
MCHC RBC AUTO-ENTMCNC: 31.8 G/DL (ref 31–36)
MCV RBC AUTO: 91.3 FL (ref 80–100)
MONOCYTES ABSOLUTE: 0.1 K/UL (ref 0–1.3)
MONOCYTES RELATIVE PERCENT: 0.3 %
NEUTROPHILS ABSOLUTE: 2.6 K/UL (ref 1.7–7.7)
NEUTROPHILS RELATIVE PERCENT: 12.1 %
PDW BLD-RTO: 18 % (ref 12.4–15.4)
PLATELET # BLD: 164 K/UL (ref 135–450)
PMV BLD AUTO: 8.7 FL (ref 5–10.5)
POTASSIUM REFLEX MAGNESIUM: 4.2 MMOL/L (ref 3.5–5.1)
RBC # BLD: 3.96 M/UL (ref 4.2–5.9)
SODIUM BLD-SCNC: 143 MMOL/L (ref 136–145)
WBC # BLD: 21.5 K/UL (ref 4–11)

## 2022-01-21 PROCEDURE — 6370000000 HC RX 637 (ALT 250 FOR IP): Performed by: INTERNAL MEDICINE

## 2022-01-21 PROCEDURE — 1200000000 HC SEMI PRIVATE

## 2022-01-21 PROCEDURE — 6360000002 HC RX W HCPCS: Performed by: INTERNAL MEDICINE

## 2022-01-21 PROCEDURE — 2580000003 HC RX 258: Performed by: INTERNAL MEDICINE

## 2022-01-21 PROCEDURE — 80048 BASIC METABOLIC PNL TOTAL CA: CPT

## 2022-01-21 PROCEDURE — 85025 COMPLETE CBC W/AUTO DIFF WBC: CPT

## 2022-01-21 PROCEDURE — 36415 COLL VENOUS BLD VENIPUNCTURE: CPT

## 2022-01-21 PROCEDURE — 99232 SBSQ HOSP IP/OBS MODERATE 35: CPT | Performed by: INTERNAL MEDICINE

## 2022-01-21 RX ORDER — CALCIUM CARBONATE 200(500)MG
500 TABLET,CHEWABLE ORAL 3 TIMES DAILY PRN
Status: DISCONTINUED | OUTPATIENT
Start: 2022-01-21 | End: 2022-01-25 | Stop reason: HOSPADM

## 2022-01-21 RX ORDER — FERROUS SULFATE 325(65) MG
325 TABLET ORAL 2 TIMES DAILY WITH MEALS
Status: ON HOLD | COMMUNITY
End: 2022-02-08

## 2022-01-21 RX ORDER — CALCIUM CARBONATE 200(500)MG
500 TABLET,CHEWABLE ORAL ONCE
Status: COMPLETED | OUTPATIENT
Start: 2022-01-21 | End: 2022-01-21

## 2022-01-21 RX ADMIN — ASPIRIN 81 MG: 81 TABLET, COATED ORAL at 09:36

## 2022-01-21 RX ADMIN — VITAM B12 50 MCG: 100 TAB at 17:27

## 2022-01-21 RX ADMIN — SODIUM CHLORIDE, PRESERVATIVE FREE 10 ML: 5 INJECTION INTRAVENOUS at 09:36

## 2022-01-21 RX ADMIN — ANTACID TABLETS 500 MG: 500 TABLET, CHEWABLE ORAL at 05:04

## 2022-01-21 RX ADMIN — ENOXAPARIN SODIUM 40 MG: 100 INJECTION SUBCUTANEOUS at 09:36

## 2022-01-21 RX ADMIN — FIDAXOMICIN 200 MG: 200 TABLET, FILM COATED ORAL at 09:36

## 2022-01-21 RX ADMIN — ALLOPURINOL 300 MG: 300 TABLET ORAL at 20:43

## 2022-01-21 RX ADMIN — FIDAXOMICIN 200 MG: 200 TABLET, FILM COATED ORAL at 20:43

## 2022-01-21 RX ADMIN — SODIUM CHLORIDE, PRESERVATIVE FREE 10 ML: 5 INJECTION INTRAVENOUS at 20:43

## 2022-01-21 RX ADMIN — Medication: at 09:37

## 2022-01-21 ASSESSMENT — PAIN SCALES - GENERAL
PAINLEVEL_OUTOF10: 0

## 2022-01-21 NOTE — PROGRESS NOTES
Physician Progress Note      PATIENT:               Paul Kendrick  CSN #:                  [id number]  :                       1940  ADMIT DATE:       2022 10:24 PM  DISCH DATE:  RESPONDING  PROVIDER #:        CAM JEAN DO        QUERY TEXT:    Stage of Chronic Kidney Disease: Please provide further specificity, if known. Clinical indicators include: ckd  Options provided:  -- Chronic kidney disease stage 1  -- Chronic kidney disease stage 2  -- Chronic kidney disease stage 3  -- Chronic kidney disease stage 3a  -- Chronic kidney disease stage 3b  -- Chronic kidney disease stage 4  -- Chronic kidney disease stage 5  -- Chronic kidney disease stage 5, requiring dialysis  -- End stage renal disease  -- Other - I will add my own diagnosis  -- Disagree - Not applicable / Not valid  -- Disagree - Clinically Unable to determine / Unknown        PROVIDER RESPONSE TEXT:    The patient has chronic kidney disease stage 1. QUERY TEXT:    Patient admitted with sepsis / c-diff. Noted documentation of Acute Kidney   Injury in  PN. Documentation of creatinine baseline 1.4-1.5 according to   PCP. Patient presented with creatinine of 1.6, went to 1.7 the following day,   then back to 1.4 on . In order to support the diagnosis of DORINDA, please   include additional clinical indicators in your documentation. Or please   document if the diagnosis of DORINDA has been ruled out after further study    The medical record reflects the following:  Risk Factors: 80 y.o. female with sepsis, c-diff, CKD, HTN  Clinical Indicators: No KDIGO criteria met to support DORINDA.   Treatment: IVF, daily lab monitroing    Defined by Kidney Disease Improving Global Outcomes (KDIGO) clinical practice   guideline for acute kidney injury:  -Increase in SCr by greater than or equal to 0.3 mg/dl within 48 hours; or  -Increase or decrease in SCr to greater than or equal to 1.5 times baseline,   which is known or presumed to have occurred within the prior 7 days; or  -Urine volume < 0.5ml/kg/h for 6 hours  Options provided:  -- Acute kidney injury ruled out after study  -- Acute kidney injury evidenced by, Please document evidence. -- Other - I will add my own diagnosis  -- Disagree - Not applicable / Not valid  -- Disagree - Clinically unable to determine / Unknown  -- Refer to Clinical Documentation Reviewer    PROVIDER RESPONSE TEXT:    Acute kidney injury was ruled out after study.     Query created by: Pro Reinoso on 1/21/2022 10:59 AM      Electronically signed by:  Herberth Martinez DO 1/21/2022 12:57 PM

## 2022-01-21 NOTE — PROGRESS NOTES
Hospitalist Progress Note      PCP: Mohan Vilchis MD    Date of Admission: 1/19/2022    Chief Complaint: C. Diff    79 yo M with PMH of HTN, mantle cell lymphoma, who presents with recent C. Diff diagnosis, ongoing diarrhea, and difficulty taking his home medications. Has been feeling very weak and dehydrated. Has had decreased PO intake for several days as well. Subjective:   Has had multiple episodes of diarrhea since yesterday but urgency is decreased and he has been able to eat well. Legs are also feeling better. Medications:  Reviewed    Infusion Medications    sodium chloride       Scheduled Medications    allopurinol  300 mg Oral Nightly    aspirin  81 mg Oral Daily    vitamin B-12  50 mcg Oral QPM    sodium chloride flush  5-40 mL IntraVENous 2 times per day    enoxaparin  40 mg SubCUTAneous Daily    Fidaxomicin  200 mg Oral BID     PRN Meds: sodium chloride flush, sodium chloride, ondansetron **OR** ondansetron, polyethylene glycol, acetaminophen **OR** acetaminophen, potassium chloride **OR** potassium alternative oral replacement **OR** potassium chloride, magnesium sulfate, Hydrocerin      Intake/Output Summary (Last 24 hours) at 1/21/2022 1753  Last data filed at 1/21/2022 0936  Gross per 24 hour   Intake 370 ml   Output    Net 370 ml       Exam:    BP (!) 140/88   Pulse 98   Temp 97.1 °F (36.2 °C) (Oral)   Resp 18   Ht 6' 0.01\" (1.829 m)   Wt 214 lb 15.2 oz (97.5 kg)   SpO2 94%   BMI 29.15 kg/m²     General appearance: No apparent distress, appears stated age and cooperative. HEENT: Pupils equal, round, and reactive to light. Conjunctivae/corneas clear. Neck: Supple, with full range of motion. No jugular venous distention. Trachea midline. Respiratory:  Normal respiratory effort. Clear to auscultation, bilaterally without Rales/Wheezes/Rhonchi. Cardiovascular: Regular rate and rhythm with normal S1/S2 without murmurs, rubs or gallops.   Abdomen: Soft, non-tender, mildly distended with normal bowel sounds. Musculoskelatal: No clubbing, cyanosis or edema bilaterally. Skin: Skin color, texture, turgor normal.  No rashes or lesions. Neurologic:  Cranial nerves: II-XII intact, grossly non-focal.  Psychiatric: Alert and oriented, thought content appropriate, normal insight    Labs:   Recent Labs     01/19/22 2056 01/21/22  0657   WBC 18.0* 21.5*   HGB 11.9* 11.5*   HCT 37.9* 36.2*    164     Recent Labs     01/19/22 2056 01/20/22  1700 01/21/22  0657    144 143   K 5.0 4.1 4.2   * 112* 113*   CO2 23 26 25   BUN 28* 29* 25*   CREATININE 1.6* 1.7* 1.4*   CALCIUM 8.8 8.6 9.0     Recent Labs     01/19/22 2056   AST 53*   ALT 21   BILIDIR <0.2   BILITOT <0.2   ALKPHOS 94     No results for input(s): INR in the last 72 hours. No results for input(s): Meldon Locke in the last 72 hours. 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 difficile diarrhea [A04.72]        C. Diff colitis, severe. Recurrent  Hx of persistent diarrhea over 6 weeks and had difficulty taking his medications properly at home, developed worsening symptoms  GI, ID following. Appreciate involvement  On Dificid  Monitor electrolytes    CKD (baseline Cr 1.4-1.5 per PCP)    Venous stasis lower extremities  Continue skin/wound care    HTN    Hx of mantle cell lymphoma    DVT Prophylaxis: Lovenox  Diet: ADULT DIET;  Regular  Code Status: Full Code    PT/OT Eval Status: pending    Dispo - Inpatient    Ethel Harris DO

## 2022-01-21 NOTE — CARE COORDINATION
CM following, pt from home alone, ind pta. Plans to DC home with no needs. ID recs PO meds at DC for C-Diff. No needs at DC noted at this time.   Electronically signed by Brad Lozano RN on 1/21/2022 at 12:54 PM   [phone]

## 2022-01-21 NOTE — PLAN OF CARE
Problem: Activity Intolerance:  Goal: Ability to tolerate increased activity will improve  Description: Ability to tolerate increased activity will improve  Outcome: Ongoing  Note: Pt tolerating being UAL in his room, able to bathe himself without assistance aside from set up, tolerating well, will continue to monitor. Problem: Bowel Function - Altered:  Goal: Bowel elimination is within specified parameters  Description: Bowel elimination is within specified parameters  Outcome: Ongoing  Note: Pt's bowel movements is slowing down compared to yesterday, pt reported 14 total BMs yesterday, reporting only 5-6 so far today, will continue to monitor.

## 2022-01-21 NOTE — PROGRESS NOTES
ID Follow-up NOTE    CC:   C difficile   Antibiotics: Fidaxomicin    Admit Date: 1/19/2022  Hospital Day: 3    Subjective:     Patient eating, has appetite  Mult stools yesteday, overnight, less this am  Small, soft and not watery per pt      Objective:     Patient Vitals for the past 8 hrs:   BP Temp Temp src Pulse Resp SpO2   01/21/22 0935 139/84 97.2 °F (36.2 °C) Oral 102 16 94 %   01/21/22 0430 128/74 98.4 °F (36.9 °C) Oral 87 16 95 %     I/O last 3 completed shifts: In: 7589 [P.O.:120; I.V.:1102; IV Piggyback:1000]  Out: -   I/O this shift:  In: 10 [I.V.:10]  Out: -     EXAM:  GENERAL: No apparent distress.     HEENT: Membranes moist, no oral lesion  NECK:  Supple, no lymphadenopathy  LUNGS: Clear b/l, no rales, no dullness  CARDIAC: RRR, no murmur appreciated  ABD:  + BS, soft / NT  EXT:  No rash, no edema, no lesions  NEURO: No focal neurologic findings  PSYCH: Orientation, sensorium, mood normal  LINES:  Peripheral iv       Data Review:  Lab Results   Component Value Date    WBC 21.5 (H) 01/21/2022    HGB 11.5 (L) 01/21/2022    HCT 36.2 (L) 01/21/2022    MCV 91.3 01/21/2022     01/21/2022     Lab Results   Component Value Date    CREATININE 1.4 (H) 01/21/2022    BUN 25 (H) 01/21/2022     01/21/2022    K 4.2 01/21/2022     (H) 01/21/2022    CO2 25 01/21/2022       Hepatic Function Panel:   Lab Results   Component Value Date    ALKPHOS 94 01/19/2022    ALT 21 01/19/2022    AST 53 01/19/2022    PROT 5.7 01/19/2022    BILITOT <0.2 01/19/2022    BILIDIR <0.2 01/19/2022    IBILI see below 01/19/2022    LABALBU 2.3 01/19/2022       Micro:  No new     11/22, 12/28, 1/18/22 C diff positive [[facility]]     Imaging:   None      Scheduled Meds:   allopurinol  300 mg Oral Nightly    aspirin  81 mg Oral Daily    vitamin B-12  50 mcg Oral QPM    sodium chloride flush  5-40 mL IntraVENous 2 times per day    enoxaparin  40 mg SubCUTAneous Daily    Fidaxomicin  200 mg Oral BID Continuous Infusions:   sodium chloride         PRN Meds:  sodium chloride flush, sodium chloride, ondansetron **OR** ondansetron, polyethylene glycol, acetaminophen **OR** acetaminophen, potassium chloride **OR** potassium alternative oral replacement **OR** potassium chloride, magnesium sulfate, Hydrocerin      Assessment:     81 yo man with hx HTN, mantle cell lymphoma, nephrolithiasis, FAM     Pt has hx C diff, dx 11/22, 12/28, 1/18/22  Onset diarrhea, mult episodes, loose stool in early November   No prior antibiotics by pt hx. No blood, no fever.     Seen, + test 11/22, rx po vancomycin, did not take vancomycin as prescribed (had to travel to appointment and concerned by having BM). Completed vanco in early December. Pt reports he never had improvement in number and character of stool. Retested in 12/28, C diff pos, restarted po vanco and this time, he took med 5x/d. Again, no improvement. He called GI (followed by  Dr Binta Garcia, [facility]) and referred to [facility]     In ED 1/19, afeb, WBC 18, Cr 1.6  Admit and started on po vancomycin 125 mg qid     Seem 1/20, changed to Fidaxomicin    IMP/  Recurrent, non-resolving C diff  DORINDA - Cr down, 1.4 today  Leukocytosis - WBC 21.5 today    Plan:     Cont fidaxomicin 200 bid, d#2, complete 10 d  Cont pyllium  IVF, electrolytes, renal per Medical team    Medical Decision Making:   The following items were considered in medical decision making:  Discussion of patient care with other providers  Reviewed clinical lab tests  Reviewed radiology tests  Reviewed other diagnostic tests/interventions  Microbiology cultures and other micro tests reviewed      Discussed with pt, GI - Dr Binta Garcia  Will sign off - f/u with Dr Binta Garcia after discharge  Beth Jose MD

## 2022-01-21 NOTE — PROGRESS NOTES
Subjective: We are following for severe C. difficile diarrhea. Patient reports feeling overall \"better today\". He had 14 bowel movements yesterday and he feels the stools are slightly more formed. This is more bowel movements than he had the day before but he has been eating regular meals, whereas previously he was not eating to avoid diarrhea. Stools are brown in color. No nausea, vomiting. The anal pain is currently resolved as long as he does not push on that area. Objective:    Review of Systems:    Constitutional: Negative for fever, chills, and unexpected weight change. HENT: Negative for trouble swallowing. Respiratory: Negative for cough, chest tightness and shortness of breath. Cardiovascular: Negative for chest pain  Gastrointestinal: see HPI  Musculoskeletal: Negative for unusual arthralgias. Skin: Negative for rash. Scheduled Meds:   allopurinol  300 mg Oral Nightly    aspirin  81 mg Oral Daily    vitamin B-12  50 mcg Oral QPM    sodium chloride flush  5-40 mL IntraVENous 2 times per day    enoxaparin  40 mg SubCUTAneous Daily    Fidaxomicin  200 mg Oral BID     Continuous Infusions:   sodium chloride         Vitals:  /82   Pulse 100   Temp 96.8 °F (36 °C) (Oral)   Resp 18   Ht 6' 0.01\" (1.829 m)   Wt 214 lb 15.2 oz (97.5 kg)   SpO2 96%   BMI 29.15 kg/m²     Exam:        Labs and Imaging:  I reviewed the labs and imaging results from last 24 hours. Assessment:  -Severe C. difficile diarrhea based on acute kidney injury and initial leukocytosis. His white blood cell count today is 21,000, up slightly from yesterday of 18,000. His acute kidney injury is improving based on improvements in creatinine. The diarrhea is more frequent today but that is because he is eating. He has started on Dificid now, which has a similar response rate to vancomycin while on therapy but incremental improvement in recurrence of C. Difficile.     Plan:  -Continue diet.  -Okay to hold off on colestipol and other bile salt binding medications as well as fiber for the moment. He is used to taking fiber tabs and does not prefer the powder fiber   -Dificid 200 mg twice a day for total of 10 days. Social work will help make sure this is covered by his insurance. I have written a prescription which can be sent down to the [address] so he can pick it up over the weekend, if he is discharged.         Carmen Sharma MD  January 21, 2022

## 2022-01-21 NOTE — PROGRESS NOTES
Dr Jake Khan with GI had asked case management to see if they could help the pt obtain Dificid for home, in order for them to see what they could do before outpatient pharmacy closes on the weekends RN asked for a prescription to send to outpatient pharmacy, prescription totaled at $1568.55, Case Management wasn't sure if the hospital would cover that, and asked RN to message Dr Radha Carrillo about \"appealing to a different tier of medication\" DANTE Carrillo, awaiting response.

## 2022-01-22 LAB
ANION GAP SERPL CALCULATED.3IONS-SCNC: 7 MMOL/L (ref 3–16)
ANISOCYTOSIS: ABNORMAL
ATYPICAL LYMPHOCYTE RELATIVE PERCENT: 31 % (ref 0–6)
BANDED NEUTROPHILS RELATIVE PERCENT: 1 % (ref 0–7)
BASOPHILS ABSOLUTE: 0 K/UL (ref 0–0.2)
BASOPHILS RELATIVE PERCENT: 0 %
BUN BLDV-MCNC: 23 MG/DL (ref 7–20)
BURR CELLS: ABNORMAL
CALCIUM SERPL-MCNC: 8.8 MG/DL (ref 8.3–10.6)
CHLORIDE BLD-SCNC: 112 MMOL/L (ref 99–110)
CO2: 24 MMOL/L (ref 21–32)
CREAT SERPL-MCNC: 1.4 MG/DL (ref 0.8–1.3)
EOSINOPHILS ABSOLUTE: 0 K/UL (ref 0–0.6)
EOSINOPHILS RELATIVE PERCENT: 0 %
GFR AFRICAN AMERICAN: 59
GFR NON-AFRICAN AMERICAN: 49
GLUCOSE BLD-MCNC: 110 MG/DL (ref 70–99)
HCT VFR BLD CALC: 33.9 % (ref 40.5–52.5)
HEMATOLOGY PATH CONSULT: YES
HEMOGLOBIN: 10.6 G/DL (ref 13.5–17.5)
LYMPHOCYTES ABSOLUTE: 14.7 K/UL (ref 1–5.1)
LYMPHOCYTES RELATIVE PERCENT: 46 %
MAGNESIUM: 1.5 MG/DL (ref 1.8–2.4)
MCH RBC QN AUTO: 28.8 PG (ref 26–34)
MCHC RBC AUTO-ENTMCNC: 31.3 G/DL (ref 31–36)
MCV RBC AUTO: 92 FL (ref 80–100)
MONOCYTES ABSOLUTE: 0.6 K/UL (ref 0–1.3)
MONOCYTES RELATIVE PERCENT: 3 %
NEUTROPHILS ABSOLUTE: 3.8 K/UL (ref 1.7–7.7)
NEUTROPHILS RELATIVE PERCENT: 19 %
OVALOCYTES: ABNORMAL
PDW BLD-RTO: 18 % (ref 12.4–15.4)
PLATELET # BLD: 146 K/UL (ref 135–450)
PMV BLD AUTO: 8.4 FL (ref 5–10.5)
POLYCHROMASIA: ABNORMAL
POTASSIUM SERPL-SCNC: 4.2 MMOL/L (ref 3.5–5.1)
RBC # BLD: 3.68 M/UL (ref 4.2–5.9)
SODIUM BLD-SCNC: 143 MMOL/L (ref 136–145)
WBC # BLD: 19.1 K/UL (ref 4–11)

## 2022-01-22 PROCEDURE — 6360000002 HC RX W HCPCS: Performed by: INTERNAL MEDICINE

## 2022-01-22 PROCEDURE — 6370000000 HC RX 637 (ALT 250 FOR IP): Performed by: INTERNAL MEDICINE

## 2022-01-22 PROCEDURE — 1200000000 HC SEMI PRIVATE

## 2022-01-22 PROCEDURE — 83735 ASSAY OF MAGNESIUM: CPT

## 2022-01-22 PROCEDURE — 36415 COLL VENOUS BLD VENIPUNCTURE: CPT

## 2022-01-22 PROCEDURE — 80048 BASIC METABOLIC PNL TOTAL CA: CPT

## 2022-01-22 PROCEDURE — 2580000003 HC RX 258: Performed by: INTERNAL MEDICINE

## 2022-01-22 PROCEDURE — 85025 COMPLETE CBC W/AUTO DIFF WBC: CPT

## 2022-01-22 RX ORDER — MAGNESIUM SULFATE IN WATER 40 MG/ML
2000 INJECTION, SOLUTION INTRAVENOUS ONCE
Status: COMPLETED | OUTPATIENT
Start: 2022-01-22 | End: 2022-01-22

## 2022-01-22 RX ADMIN — SODIUM CHLORIDE, PRESERVATIVE FREE 10 ML: 5 INJECTION INTRAVENOUS at 21:16

## 2022-01-22 RX ADMIN — ANTACID TABLETS 500 MG: 500 TABLET, CHEWABLE ORAL at 00:10

## 2022-01-22 RX ADMIN — ENOXAPARIN SODIUM 40 MG: 100 INJECTION SUBCUTANEOUS at 09:21

## 2022-01-22 RX ADMIN — FIDAXOMICIN 200 MG: 200 TABLET, FILM COATED ORAL at 21:22

## 2022-01-22 RX ADMIN — SODIUM CHLORIDE, PRESERVATIVE FREE 10 ML: 5 INJECTION INTRAVENOUS at 09:32

## 2022-01-22 RX ADMIN — ALLOPURINOL 300 MG: 300 TABLET ORAL at 21:08

## 2022-01-22 RX ADMIN — MAGNESIUM SULFATE HEPTAHYDRATE 2000 MG: 2 INJECTION, SOLUTION INTRAVENOUS at 21:08

## 2022-01-22 RX ADMIN — FIDAXOMICIN 200 MG: 200 TABLET, FILM COATED ORAL at 09:32

## 2022-01-22 RX ADMIN — ASPIRIN 81 MG: 81 TABLET, COATED ORAL at 09:24

## 2022-01-22 RX ADMIN — VITAM B12 50 MCG: 100 TAB at 19:02

## 2022-01-22 RX ADMIN — ANTACID TABLETS 500 MG: 500 TABLET, CHEWABLE ORAL at 23:10

## 2022-01-22 ASSESSMENT — PAIN DESCRIPTION - LOCATION: LOCATION: ABDOMEN

## 2022-01-22 ASSESSMENT — PAIN DESCRIPTION - PAIN TYPE: TYPE: ACUTE PAIN

## 2022-01-22 ASSESSMENT — PAIN DESCRIPTION - FREQUENCY: FREQUENCY: CONTINUOUS

## 2022-01-22 ASSESSMENT — PAIN DESCRIPTION - ONSET: ONSET: ON-GOING

## 2022-01-22 ASSESSMENT — PAIN SCALES - GENERAL
PAINLEVEL_OUTOF10: 0
PAINLEVEL_OUTOF10: 3
PAINLEVEL_OUTOF10: 0

## 2022-01-22 ASSESSMENT — PAIN DESCRIPTION - DESCRIPTORS: DESCRIPTORS: CRAMPING

## 2022-01-22 NOTE — PROGRESS NOTES
Physical Therapy & Occupational Therapy  DC Summary       Therapy orders received, acknowledged & appreciated. Upon discussion with bedside RN patient mobilizing independently, completing self care & demo's no acute care PT or OT needs. PT & OT will sign off at this time/DC. No evaluation, no charge. Please re-refer should new needs arise. Thank you. Ponce Finley.  India Yadavost, OTR/L #[id number]

## 2022-01-22 NOTE — PROGRESS NOTES
Assessment:  -Severe C. difficile diarrhea based on acute kidney injury and initial leukocytosis. We do not have a repeat CBC today but his white blood cell count was still 21,000 yesterday. Also, renal panel not back today. Clinically, he is responding to treatment. I don't think we need to add flagyl. Plan:  -Continue diet.  -Okay to hold off on colestipol and other bile salt binding medications as well as fiber for the moment. He is used to taking fiber tabs and does not prefer the powder fiber   -Dificid 200 mg twice a day for total of 10 days (started 1/20). Social work will help make sure this is covered by his insurance had asked me to send the full prescription to his outpatient pharmacy. -if he fails dificid, we could consider fecal transplant, invoking emergency use from [facility]. Subjective: We are following for C. difficile colitis. Stools are overall with more form, and had 10 bm over last 24 hrs, down from 14 the day before. Having some mild rectal discomfort and nonspecific mild abd pain today. Tolerating diet. He notes feeling anxious about the cost of dificid when he leaves. Objective:    Review of Systems:    Constitutional: Negative for fever, chills, and unexpected weight change. HENT: Negative for trouble swallowing. Respiratory: Negative for cough, chest tightness and shortness of breath. Cardiovascular: Negative for chest pain  Gastrointestinal: see HPI  Musculoskeletal: Negative for unusual arthralgias. Skin: Negative for rash.      Scheduled Meds:   allopurinol  300 mg Oral Nightly    aspirin  81 mg Oral Daily    vitamin B-12  50 mcg Oral QPM    sodium chloride flush  5-40 mL IntraVENous 2 times per day    enoxaparin  40 mg SubCUTAneous Daily    Fidaxomicin  200 mg Oral BID     Continuous Infusions:   sodium chloride         Vitals:  BP (!) 144/85   Pulse 95   Temp 99.2 °F (37.3 °C) (Oral)   Resp 18   Ht 6' 0.01\" (1.829 m)   Wt 214 lb 15.2 oz (97.5 kg)   SpO2 91%   BMI 29.15 kg/m²     Exam:        Labs and Imaging:  I reviewed the labs and imaging results from last 24 hours.         Pao Martínez MD  January 22, 2022

## 2022-01-22 NOTE — PROGRESS NOTES
Patient A & O x3. VSS. Continues to have diarrhea, c/o abdominal cramping. Abdomen noted with some distention but bowels are active throughout.  Gave PO antibiotics    Electronically signed by Gladys Canales RN on 1/22/2022 at 12:45 PM

## 2022-01-22 NOTE — PROGRESS NOTES
Hospitalist Progress Note      PCP: Miguel Alpers, MD    Date of Admission: 1/19/2022    Chief Complaint: C. Diff    79 yo M with PMH of HTN, mantle cell lymphoma, who presents with recent C. Diff diagnosis, ongoing diarrhea, and difficulty taking his home medications. Has been feeling very weak and dehydrated. Has had decreased PO intake for several days as well. Subjective:   Still having frequent diarrhea but some of the stool is appearing more formed. 12 episodes per day. Medications:  Reviewed    Infusion Medications    sodium chloride       Scheduled Medications    allopurinol  300 mg Oral Nightly    aspirin  81 mg Oral Daily    vitamin B-12  50 mcg Oral QPM    sodium chloride flush  5-40 mL IntraVENous 2 times per day    enoxaparin  40 mg SubCUTAneous Daily    Fidaxomicin  200 mg Oral BID     PRN Meds: calcium carbonate, sodium chloride flush, sodium chloride, ondansetron **OR** ondansetron, polyethylene glycol, acetaminophen **OR** acetaminophen, potassium chloride **OR** potassium alternative oral replacement **OR** potassium chloride, magnesium sulfate, Hydrocerin      Intake/Output Summary (Last 24 hours) at 1/22/2022 0835  Last data filed at 1/21/2022 2044  Gross per 24 hour   Intake 610 ml   Output    Net 610 ml       Exam:    /84   Pulse 97   Temp 98.7 °F (37.1 °C) (Oral)   Resp 16   Ht 6' 0.01\" (1.829 m)   Wt 214 lb 15.2 oz (97.5 kg)   SpO2 91%   BMI 29.15 kg/m²     General appearance: No apparent distress, appears stated age and cooperative. HEENT: Pupils equal, round, and reactive to light. Conjunctivae/corneas clear. Neck: Supple, with full range of motion. No jugular venous distention. Trachea midline. Respiratory:  Normal respiratory effort. Clear to auscultation, bilaterally without Rales/Wheezes/Rhonchi. Cardiovascular: Regular rate and rhythm with normal S1/S2 without murmurs, rubs or gallops.   Abdomen: Soft, non-tender, mildly distended with normal bowel sounds. Musculoskelatal: No clubbing, cyanosis or edema bilaterally. Skin: Skin color, texture, turgor normal.  No rashes or lesions. Neurologic:  Cranial nerves: II-XII intact, grossly non-focal.  Psychiatric: Alert and oriented, thought content appropriate, normal insight    Labs:   Recent Labs     01/19/22 2056 01/21/22  0657   WBC 18.0* 21.5*   HGB 11.9* 11.5*   HCT 37.9* 36.2*    164     Recent Labs     01/19/22 2056 01/20/22  1700 01/21/22  0657    144 143   K 5.0 4.1 4.2   * 112* 113*   CO2 23 26 25   BUN 28* 29* 25*   CREATININE 1.6* 1.7* 1.4*   CALCIUM 8.8 8.6 9.0     Recent Labs     01/19/22 2056   AST 53*   ALT 21   BILIDIR <0.2   BILITOT <0.2   ALKPHOS 94     No results for input(s): INR in the last 72 hours. No results for input(s): Ayah Divine in the last 72 hours. 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 [address] [N17.9] 01/20/2022    C. difficile diarrhea [A04.72]        C. Diff colitis, severe. Recurrent  Hx of persistent diarrhea over 6 weeks and had difficulty taking his medications properly at home, developed worsening symptoms  GI, ID following. Appreciate involvement  On Dificid started 1/20  Monitor electrolytes  Replace Mg, K as needed    CKD (baseline Cr 1.4-1.5 per PCP)    Venous stasis lower extremities  Continue skin/wound care    HTN    Hx of mantle cell lymphoma    DVT Prophylaxis: Lovenox  Diet: ADULT DIET;  Regular  Code Status: Full Code    PT/OT Eval Status: pending    Dispo - Inpatient pending decreased frequency of diarrhea and improvement on CBC    Ethel Harris DO

## 2022-01-22 NOTE — PLAN OF CARE
Problem: Bowel Function - Altered:  Goal: Bowel elimination is within specified parameters  Description: Bowel elimination is within specified parameters  Pt reports bowel movements have decreased. Has reported one since 1900. Dificid given as scheduled. Problem: Fluid Volume:  Goal: Will show no signs and symptoms of electrolyte imbalance  Description: Will show no signs and symptoms of electrolyte imbalance  Outcome: Ongoing   Pt has been eating and drinking well. Pt ate 50-75% of dinner tray. VSS.

## 2022-01-22 NOTE — PROGRESS NOTES
Pt has been sleeping in bed. Reports just 1 bm so far tonight. Scheduled medications given. Contact plus isolation maintained. No needs at this time. Will continue to monitor.   Electronically signed by Antoinette Vásquez RN on 1/22/22 at 2:04 AM EST

## 2022-01-22 NOTE — PLAN OF CARE
Problem: Bowel Function - Altered:  Goal: Bowel elimination is within specified parameters  Description: Bowel elimination is within specified parameters  Outcome: Ongoing  Note: Pt has had multiple soft/liquid Bms this morning. Getting Dificid     Problem: Fluid Volume - Deficit:  Goal: Absence of fluid volume deficit signs and symptoms  Description: Absence of fluid volume deficit signs and symptoms  Outcome: Ongoing  Note: Has diarrhea but strong PO intake.  Hemodynamically stable

## 2022-01-23 LAB
ANION GAP SERPL CALCULATED.3IONS-SCNC: 5 MMOL/L (ref 3–16)
ANISOCYTOSIS: ABNORMAL
BASOPHILS ABSOLUTE: 0.1 K/UL (ref 0–0.2)
BASOPHILS RELATIVE PERCENT: 0.3 %
BUN BLDV-MCNC: 19 MG/DL (ref 7–20)
CALCIUM SERPL-MCNC: 8.8 MG/DL (ref 8.3–10.6)
CHLORIDE BLD-SCNC: 113 MMOL/L (ref 99–110)
CO2: 26 MMOL/L (ref 21–32)
CREAT SERPL-MCNC: 1.3 MG/DL (ref 0.8–1.3)
EOSINOPHILS ABSOLUTE: 0.1 K/UL (ref 0–0.6)
EOSINOPHILS RELATIVE PERCENT: 0.3 %
GFR AFRICAN AMERICAN: >60
GFR NON-AFRICAN AMERICAN: 53
GLUCOSE BLD-MCNC: 90 MG/DL (ref 70–99)
HCT VFR BLD CALC: 33.3 % (ref 40.5–52.5)
HEMOGLOBIN: 10.6 G/DL (ref 13.5–17.5)
LYMPHOCYTES ABSOLUTE: 17.7 K/UL (ref 1–5.1)
LYMPHOCYTES RELATIVE PERCENT: 84.8 %
MAGNESIUM: 1.7 MG/DL (ref 1.8–2.4)
MCH RBC QN AUTO: 28.9 PG (ref 26–34)
MCHC RBC AUTO-ENTMCNC: 31.8 G/DL (ref 31–36)
MCV RBC AUTO: 90.8 FL (ref 80–100)
MONOCYTES ABSOLUTE: 0.3 K/UL (ref 0–1.3)
MONOCYTES RELATIVE PERCENT: 1.3 %
NEUTROPHILS ABSOLUTE: 2.8 K/UL (ref 1.7–7.7)
NEUTROPHILS RELATIVE PERCENT: 13.3 %
OVALOCYTES: ABNORMAL
PDW BLD-RTO: 17.9 % (ref 12.4–15.4)
PLATELET # BLD: 141 K/UL (ref 135–450)
PMV BLD AUTO: 8 FL (ref 5–10.5)
POTASSIUM SERPL-SCNC: 4.5 MMOL/L (ref 3.5–5.1)
RBC # BLD: 3.67 M/UL (ref 4.2–5.9)
SLIDE REVIEW: ABNORMAL
SODIUM BLD-SCNC: 144 MMOL/L (ref 136–145)
TEAR DROP CELLS: ABNORMAL
WBC # BLD: 20.8 K/UL (ref 4–11)

## 2022-01-23 PROCEDURE — 1200000000 HC SEMI PRIVATE

## 2022-01-23 PROCEDURE — 85025 COMPLETE CBC W/AUTO DIFF WBC: CPT

## 2022-01-23 PROCEDURE — 83735 ASSAY OF MAGNESIUM: CPT

## 2022-01-23 PROCEDURE — 6360000002 HC RX W HCPCS: Performed by: INTERNAL MEDICINE

## 2022-01-23 PROCEDURE — 6370000000 HC RX 637 (ALT 250 FOR IP): Performed by: INTERNAL MEDICINE

## 2022-01-23 PROCEDURE — 80048 BASIC METABOLIC PNL TOTAL CA: CPT

## 2022-01-23 PROCEDURE — 2580000003 HC RX 258: Performed by: INTERNAL MEDICINE

## 2022-01-23 PROCEDURE — 36415 COLL VENOUS BLD VENIPUNCTURE: CPT

## 2022-01-23 RX ORDER — CHOLESTYRAMINE LIGHT 4 G/5.7G
4 POWDER, FOR SUSPENSION ORAL
Status: DISCONTINUED | OUTPATIENT
Start: 2022-01-23 | End: 2022-01-25 | Stop reason: HOSPADM

## 2022-01-23 RX ORDER — MAGNESIUM SULFATE 1 G/100ML
1000 INJECTION INTRAVENOUS ONCE
Status: COMPLETED | OUTPATIENT
Start: 2022-01-23 | End: 2022-01-23

## 2022-01-23 RX ADMIN — FIDAXOMICIN 200 MG: 200 TABLET, FILM COATED ORAL at 08:21

## 2022-01-23 RX ADMIN — SODIUM CHLORIDE, PRESERVATIVE FREE 10 ML: 5 INJECTION INTRAVENOUS at 08:21

## 2022-01-23 RX ADMIN — SODIUM CHLORIDE, PRESERVATIVE FREE 10 ML: 5 INJECTION INTRAVENOUS at 20:45

## 2022-01-23 RX ADMIN — ENOXAPARIN SODIUM 40 MG: 100 INJECTION SUBCUTANEOUS at 08:21

## 2022-01-23 RX ADMIN — Medication: at 14:39

## 2022-01-23 RX ADMIN — MAGNESIUM SULFATE HEPTAHYDRATE 1000 MG: 1 INJECTION, SOLUTION INTRAVENOUS at 10:33

## 2022-01-23 RX ADMIN — FIDAXOMICIN 200 MG: 200 TABLET, FILM COATED ORAL at 20:45

## 2022-01-23 RX ADMIN — ALLOPURINOL 300 MG: 300 TABLET ORAL at 20:45

## 2022-01-23 RX ADMIN — VITAM B12 50 MCG: 100 TAB at 20:45

## 2022-01-23 RX ADMIN — ASPIRIN 81 MG: 81 TABLET, COATED ORAL at 08:21

## 2022-01-23 RX ADMIN — CHOLESTYRAMINE 4 G: 4 POWDER, FOR SUSPENSION ORAL at 14:36

## 2022-01-23 ASSESSMENT — PAIN SCALES - GENERAL
PAINLEVEL_OUTOF10: 0

## 2022-01-23 NOTE — PROGRESS NOTES
Pt alert and oriented. VSS. Pt received Magnesium replacement, see MAR. Pt states bowel movements have slowed down throughout the day. Pt tolerating diet. Pt voiding freely. Pt has call light within reach, bed in lowest position with wheels locked, 2/4 side rails up, and pt is up ad oscar. Will continue to monitor.

## 2022-01-23 NOTE — PLAN OF CARE
Problem: Activity Intolerance:  Goal: Ability to tolerate increased activity will improve  Description: Ability to tolerate increased activity will improve  Outcome: Ongoing     Problem: Bowel Function - Altered:  Goal: Bowel elimination is within specified parameters  Description: Bowel elimination is within specified parameters  Outcome: Ongoing     Problem:  Bowel/Gastric:  Goal: Occurrences of diarrhea will decrease  Description: Occurrences of diarrhea will decrease  Outcome: Ongoing

## 2022-01-23 NOTE — PROGRESS NOTES
Assessment  · Severe C. difficile diarrhea based on acute kidney injury (resolved now) and leukocytosis (stable 20k)   Clinically, he is responding to treatment. · Ext hemorrhoids, with intermittent discomfort. Doesn't need prep h at this time. Plan:   Add fiber supplement   Add cholestyramine powder in middle of the day. Would like it to be 1 hr after any other oral meds and not to give any oral meds for 2 hrs after. I verified this with pharmacy.  Continue dificid 200mg bid (started 1/20)   Continue diet   If diarrhea improves to maybe 7 per day and WBC trending down, he can discharge to complete 10 day course dificid.  If he fails dificid, could arrange FMT. Pt aware. Discussed with Dr Ralph Ferro. Subjective: We are following for  Severe c diff colitis. Had 12 bm in last 24 hrs, similar to day before, but half were actually formed. No abd pain. Appetite is excellent. Feeling less anxious. Objective:    Review of Systems:    Constitutional: Negative for fever, chills, and unexpected weight change. HENT: Negative for trouble swallowing. Respiratory: Negative for cough, chest tightness and shortness of breath. Cardiovascular: Negative for chest pain  Gastrointestinal: see HPI  Musculoskeletal: Negative for unusual arthralgias. Skin: Negative for rash. Scheduled Meds:   allopurinol  300 mg Oral Nightly    aspirin  81 mg Oral Daily    vitamin B-12  50 mcg Oral QPM    sodium chloride flush  5-40 mL IntraVENous 2 times per day    enoxaparin  40 mg SubCUTAneous Daily    Fidaxomicin  200 mg Oral BID     Continuous Infusions:   sodium chloride         Vitals:  BP (!) 143/88   Pulse 102   Temp 97.1 °F (36.2 °C) (Oral)   Resp 16   Ht 6' 0.01\" (1.829 m)   Wt 214 lb 15.2 oz (97.5 kg)   SpO2 95%   BMI 29.15 kg/m²     Exam:  General:  comfortable  Heent: There is no scleral icterus. Cardiovascular: The heart is regular rate and rhythm.   Respiratory:  The patient's breathing is non-labored with normal chest wall excursion and normal muscle movement. Abdomen: The abdomen is nondistended, soft, and nontender. Rectal:  deferred   Extremities:  No edema. Neurological:  Gross memory appears intact. Patient is alert and oriented. Psychiatric:  Mood appears appropriate with judgement intact. Labs and Imaging:  I reviewed the labs and imaging results from last 24 hours.       Noelle Anguiano MD  January 23, 2022

## 2022-01-23 NOTE — PROGRESS NOTES
Patient is A&O. VSS. Patient with a decrease in BMs this shift. No cramping at this time. Patient continues on PO abx. No other needs.     Electronically signed by Zohra Ayala RN on 1/23/2022 at 1:28 PM

## 2022-01-24 LAB
ANION GAP SERPL CALCULATED.3IONS-SCNC: 2 MMOL/L (ref 3–16)
ANISOCYTOSIS: ABNORMAL
BASOPHILS ABSOLUTE: 0 K/UL (ref 0–0.2)
BASOPHILS RELATIVE PERCENT: 0 %
BUN BLDV-MCNC: 18 MG/DL (ref 7–20)
CALCIUM SERPL-MCNC: 8.9 MG/DL (ref 8.3–10.6)
CHLORIDE BLD-SCNC: 112 MMOL/L (ref 99–110)
CO2: 27 MMOL/L (ref 21–32)
CREAT SERPL-MCNC: 1.3 MG/DL (ref 0.8–1.3)
EOSINOPHILS ABSOLUTE: 0.2 K/UL (ref 0–0.6)
EOSINOPHILS RELATIVE PERCENT: 1 %
GFR AFRICAN AMERICAN: >60
GFR NON-AFRICAN AMERICAN: 53
GLUCOSE BLD-MCNC: 97 MG/DL (ref 70–99)
HCT VFR BLD CALC: 31.9 % (ref 40.5–52.5)
HEMATOLOGY PATH CONSULT: NORMAL
HEMOGLOBIN: 10.1 G/DL (ref 13.5–17.5)
HYPOCHROMIA: ABNORMAL
LYMPHOCYTES ABSOLUTE: 19.6 K/UL (ref 1–5.1)
LYMPHOCYTES RELATIVE PERCENT: 83 %
MAGNESIUM: 1.7 MG/DL (ref 1.8–2.4)
MCH RBC QN AUTO: 29 PG (ref 26–34)
MCHC RBC AUTO-ENTMCNC: 31.7 G/DL (ref 31–36)
MCV RBC AUTO: 91.2 FL (ref 80–100)
MONOCYTES ABSOLUTE: 0 K/UL (ref 0–1.3)
MONOCYTES RELATIVE PERCENT: 0 %
NEUTROPHILS ABSOLUTE: 3.8 K/UL (ref 1.7–7.7)
NEUTROPHILS RELATIVE PERCENT: 16 %
PDW BLD-RTO: 17.8 % (ref 12.4–15.4)
PLATELET # BLD: 142 K/UL (ref 135–450)
PMV BLD AUTO: 8.5 FL (ref 5–10.5)
POTASSIUM SERPL-SCNC: 4.5 MMOL/L (ref 3.5–5.1)
RBC # BLD: 3.5 M/UL (ref 4.2–5.9)
SODIUM BLD-SCNC: 141 MMOL/L (ref 136–145)
WBC # BLD: 23.6 K/UL (ref 4–11)

## 2022-01-24 PROCEDURE — 99232 SBSQ HOSP IP/OBS MODERATE 35: CPT | Performed by: INTERNAL MEDICINE

## 2022-01-24 PROCEDURE — 85025 COMPLETE CBC W/AUTO DIFF WBC: CPT

## 2022-01-24 PROCEDURE — 6370000000 HC RX 637 (ALT 250 FOR IP): Performed by: INTERNAL MEDICINE

## 2022-01-24 PROCEDURE — 36415 COLL VENOUS BLD VENIPUNCTURE: CPT

## 2022-01-24 PROCEDURE — 83735 ASSAY OF MAGNESIUM: CPT

## 2022-01-24 PROCEDURE — 1200000000 HC SEMI PRIVATE

## 2022-01-24 PROCEDURE — 6360000002 HC RX W HCPCS: Performed by: STUDENT IN AN ORGANIZED HEALTH CARE EDUCATION/TRAINING PROGRAM

## 2022-01-24 PROCEDURE — 2580000003 HC RX 258: Performed by: INTERNAL MEDICINE

## 2022-01-24 PROCEDURE — 6360000002 HC RX W HCPCS: Performed by: INTERNAL MEDICINE

## 2022-01-24 PROCEDURE — 80048 BASIC METABOLIC PNL TOTAL CA: CPT

## 2022-01-24 RX ORDER — MAGNESIUM SULFATE 1 G/100ML
1000 INJECTION INTRAVENOUS ONCE
Status: COMPLETED | OUTPATIENT
Start: 2022-01-24 | End: 2022-01-24

## 2022-01-24 RX ADMIN — ASPIRIN 81 MG: 81 TABLET, COATED ORAL at 09:19

## 2022-01-24 RX ADMIN — ALLOPURINOL 300 MG: 300 TABLET ORAL at 20:45

## 2022-01-24 RX ADMIN — ANTACID TABLETS 500 MG: 500 TABLET, CHEWABLE ORAL at 03:58

## 2022-01-24 RX ADMIN — SODIUM CHLORIDE, PRESERVATIVE FREE 10 ML: 5 INJECTION INTRAVENOUS at 20:41

## 2022-01-24 RX ADMIN — FIDAXOMICIN 200 MG: 200 TABLET, FILM COATED ORAL at 09:20

## 2022-01-24 RX ADMIN — FIDAXOMICIN 200 MG: 200 TABLET, FILM COATED ORAL at 20:45

## 2022-01-24 RX ADMIN — MAGNESIUM SULFATE HEPTAHYDRATE 1000 MG: 1 INJECTION, SOLUTION INTRAVENOUS at 11:25

## 2022-01-24 RX ADMIN — Medication: at 20:47

## 2022-01-24 RX ADMIN — ANTACID TABLETS 500 MG: 500 TABLET, CHEWABLE ORAL at 20:54

## 2022-01-24 RX ADMIN — ANTACID TABLETS 500 MG: 500 TABLET, CHEWABLE ORAL at 16:49

## 2022-01-24 RX ADMIN — VITAM B12 50 MCG: 100 TAB at 16:50

## 2022-01-24 RX ADMIN — SODIUM CHLORIDE, PRESERVATIVE FREE 10 ML: 5 INJECTION INTRAVENOUS at 09:20

## 2022-01-24 RX ADMIN — CHOLESTYRAMINE 4 G: 4 POWDER, FOR SUSPENSION ORAL at 13:00

## 2022-01-24 RX ADMIN — ENOXAPARIN SODIUM 40 MG: 100 INJECTION SUBCUTANEOUS at 09:19

## 2022-01-24 ASSESSMENT — PAIN SCALES - GENERAL
PAINLEVEL_OUTOF10: 0
PAINLEVEL_OUTOF10: 2
PAINLEVEL_OUTOF10: 0

## 2022-01-24 ASSESSMENT — PAIN DESCRIPTION - ONSET: ONSET: ON-GOING

## 2022-01-24 ASSESSMENT — PAIN DESCRIPTION - LOCATION: LOCATION: ABDOMEN

## 2022-01-24 ASSESSMENT — PAIN DESCRIPTION - FREQUENCY: FREQUENCY: INTERMITTENT

## 2022-01-24 ASSESSMENT — PAIN DESCRIPTION - DESCRIPTORS: DESCRIPTORS: CRAMPING

## 2022-01-24 ASSESSMENT — PAIN DESCRIPTION - PROGRESSION: CLINICAL_PROGRESSION: NOT CHANGED

## 2022-01-24 ASSESSMENT — PAIN DESCRIPTION - ORIENTATION: ORIENTATION: MID

## 2022-01-24 ASSESSMENT — PAIN DESCRIPTION - PAIN TYPE: TYPE: ACUTE PAIN

## 2022-01-24 ASSESSMENT — PAIN - FUNCTIONAL ASSESSMENT: PAIN_FUNCTIONAL_ASSESSMENT: ACTIVITIES ARE NOT PREVENTED

## 2022-01-24 NOTE — PROGRESS NOTES
Hospitalist Progress Note      SYNOPSIS: Patient admitted on 2022 for <principal problem not specified>      SUBJECTIVE:  Pt admitted 5 days ago for C.diff colitis diarrhea. He is currently on Dificid daily. His WBC count has gone from 19,000 to 23,000 over last 2 days. He reports continues to have multiple diarrhea stools (6-7 per day). No fevers and no abd pain and lalit solid po food and liquids. Pt is being managed by ID. Temp (24hrs), Av.7 °F (36.5 °C), Min:97 °F (36.1 °C), Max:98.4 °F (36.9 °C)    DIET: ADULT DIET; Regular  CODE: Full Code    Intake/Output Summary (Last 24 hours) at 2022 1511  Last data filed at 2022 0920  Gross per 24 hour   Intake 10 ml   Output    Net 10 ml       Review of Systems  All bolded are positive; please see HPI  General:  Fever, chills, diaphoresis, fatigue, malaise, night sweats, weight loss  Psychological:  Anxiety, disorientation, hallucinations. ENT:  Epistaxis, headaches, vertigo, visual changes. Cardiovascular:  Chest pain, irregular heartbeats, palpitations, paroxysmal nocturnal dyspnea. Respiratory:  Shortness of breath, coughing, sputum production, hemoptysis, wheezing, orthopnea.   Gastrointestinal:  Nausea, vomiting, diarrhea, heartburn, constipation, abdominal pain, hematemesis, hematochezia, melena, acholic stools  Genito-Urinary:  Dysuria, urgency, frequency, hematuria  Musculoskeletal:  Joint pain, joint stiffness, joint swelling, muscle pain  Neurology:  Headache, focal neurological deficits, weakness, numbness, paresthesia  Derm:  Rashes, ulcers, excoriations, bruising  Extremities:  Decreased ROM, peripheral edema, mottling      OBJECTIVE:    BP (!) 144/80   Pulse 105   Temp 97 °F (36.1 °C) (Oral)   Resp 18   Ht 6' 0.01\" (1.829 m)   Wt 214 lb 15.2 oz (97.5 kg)   SpO2 93%   BMI 29.15 kg/m²     General appearance:  awake, alert, and oriented to person, place, time, and purpose; appears stated age and cooperative; no apparent distress no labored breathing  HEENT:  Conjunctivae/corneas clear. Neck: Supple. No jugular venous distention. Respiratory: symmetrical; clear to auscultation bilaterally; no wheezes; no rhonchi; no rales  Cardiovascular: rhythm regular; rate controlled; no murmurs  Abdomen: Soft, nontender, nondistended  Extremities:  peripheral pulses present; no peripheral edema; no ulcers  Musculoskeletal: No clubbing, cyanosis, no bilateral lower extremity edema. Brisk capillary refill. Skin:  No rashes  on visible skin  Neurologic: awake, alert and following commands     ASSESSMENT and PLAN:    1.C.diff diarrhea (being treated with Dificid)  2. Leukocytosis  3. GI recommends fecal transplant if not improving.          DVT Prophylaxis [] Lovenox, []  Heparin, [] SCDs, [] Ambulation   GI Prophylaxis [] PPI,  [] H2 Blocker,  [] Carafate,  [] Diet/Tube Feeds   Disposition Patient requires continued admission due to    MDM [] Low, [] Moderate,[]  High  Patient's risk as above due to        Medications:  REVIEWED DAILY    Infusion Medications    sodium chloride       Scheduled Medications    [Held by provider] cholestyramine light  4 g Oral Daily before lunch    allopurinol  300 mg Oral Nightly    aspirin  81 mg Oral Daily    vitamin B-12  50 mcg Oral QPM    sodium chloride flush  5-40 mL IntraVENous 2 times per day    enoxaparin  40 mg SubCUTAneous Daily    Fidaxomicin  200 mg Oral BID     PRN Meds: calcium carbonate, sodium chloride flush, sodium chloride, ondansetron **OR** ondansetron, polyethylene glycol, acetaminophen **OR** acetaminophen, Hydrocerin    Labs:     Recent Labs     01/22/22  1531 01/23/22  0656 01/24/22  0548   WBC 19.1* 20.8* 23.6*   HGB 10.6* 10.6* 10.1*   HCT 33.9* 33.3* 31.9*    141 142       Recent Labs     01/22/22  1531 01/23/22  0656 01/24/22  0548    144 141   K 4.2 4.5 4.5   * 113* 112*   CO2 24 26 27   BUN 23* 19 18   CREATININE 1.4* 1.3 1.3   CALCIUM 8.8 8.8 8.9       No results for input(s): PROT, ALB, ALKPHOS, ALT, AST, BILITOT, AMYLASE, LIPASE in the last 72 hours. No results for input(s): INR in the last 72 hours. No results for input(s): Rosa Anchors in the last 72 hours. Chronic labs:    Lab Results   Component Value Date    CHOL 168 04/06/2015    TRIG 217 (H) 04/06/2015    HDL 39 (L) 04/06/2015    LDLCALC 86 04/06/2015    PSA 0.63 04/06/2015    INR 1.00 12/10/2013       Radiology: REVIEWED DAILY    +++++++++++++++++++++++++++++++++++++++++++++++++  Levy Miller, DO DO  [address]  +++++++++++++++++++++++++++++++++++++++++++++++++  NOTE: This report was transcribed using voice recognition software. Every effort was made to ensure accuracy; however, inadvertent computerized transcription errors may be present.

## 2022-01-24 NOTE — PROGRESS NOTES
ID Follow-up NOTE    CC:   C difficile   Antibiotics: Fidaxomicin    Admit Date: 1/19/2022  Hospital Day: 6    Subjective:     Pt reports mult stool, loose. No assoc abd pain  Patient eating, has appetite      Objective:     Patient Vitals for the past 8 hrs:   BP Temp Temp src Pulse Resp SpO2   01/24/22 0915 (!) 142/88 97.8 °F (36.6 °C) Oral 104 18 93 %   01/24/22 0357 135/80 98.4 °F (36.9 °C) Oral 103 18 95 %     I/O last 3 completed shifts: In: 180 [P.O.:180]  Out: -   I/O this shift:  In: 10 [I.V.:10]  Out: -     EXAM:  GENERAL: No apparent distress.     HEENT: Membranes moist, no oral lesion  NECK:  Supple, no lymphadenopathy  LUNGS: Clear b/l, no rales, no dullness  CARDIAC: RRR, no murmur appreciated  ABD:  + BS, soft / NT  EXT:  No rash, no edema, no lesions  NEURO: No focal neurologic findings  PSYCH: Orientation, sensorium, mood normal  LINES:  Peripheral iv       Data Review:  Lab Results   Component Value Date    WBC 23.6 (H) 01/24/2022    HGB 10.1 (L) 01/24/2022    HCT 31.9 (L) 01/24/2022    MCV 91.2 01/24/2022     01/24/2022     Lab Results   Component Value Date    CREATININE 1.3 01/24/2022    BUN 18 01/24/2022     01/24/2022    K 4.5 01/24/2022     (H) 01/24/2022    CO2 27 01/24/2022       Hepatic Function Panel:   Lab Results   Component Value Date    ALKPHOS 94 01/19/2022    ALT 21 01/19/2022    AST 53 01/19/2022    PROT 5.7 01/19/2022    BILITOT <0.2 01/19/2022    BILIDIR <0.2 01/19/2022    IBILI see below 01/19/2022    LABALBU 2.3 01/19/2022       Micro:  No new     11/22, 12/28, 1/18/22 C diff positive [[facility]]     Imaging:   None      Scheduled Meds:   cholestyramine light  4 g Oral Daily before lunch    allopurinol  300 mg Oral Nightly    aspirin  81 mg Oral Daily    vitamin B-12  50 mcg Oral QPM    sodium chloride flush  5-40 mL IntraVENous 2 times per day    enoxaparin  40 mg SubCUTAneous Daily    Fidaxomicin  200 mg Oral BID Continuous Infusions:   sodium chloride         PRN Meds:  calcium carbonate, sodium chloride flush, sodium chloride, ondansetron **OR** ondansetron, polyethylene glycol, acetaminophen **OR** acetaminophen, Hydrocerin      Assessment:     81 yo man with hx HTN, mantle cell lymphoma, nephrolithiasis, FAM     Pt has hx C diff, dx 11/22, 12/28, 1/18/22  Onset diarrhea, mult episodes, loose stool in early November   No prior antibiotics by pt hx. No blood, no fever.     Seen, + test 11/22, rx po vancomycin, did not take vancomycin as prescribed (had to travel to appointment and concerned by having BM). Completed vanco in early December. Pt reports he never had improvement in number and character of stool. Retested in 12/28, C diff pos, restarted po vanco and this time, he took med 5x/d. Again, no improvement. He called GI (followed by  Dr Marta Vargas, [facility]) and referred to [facility]     In ED 1/19, afeb, WBC 18, Cr 1.6  Admit and started on po vancomycin 125 mg qid     Seem 1/20, changed to Fidaxomicin    IMP/  Recurrent, non-resolving C diff  DORINDA - Cr down, 1.4 today  Leukocytosis - WBC 21.5 today    Plan:     Cont fidaxomicin 200 bid, d#5, complete 10 d  Cont pyllium  IVF, electrolytes, renal per Medical team    Medical Decision Making:   The following items were considered in medical decision making:  Discussion of patient care with other providers  Reviewed clinical lab tests  Reviewed radiology tests  Reviewed other diagnostic tests/interventions  Microbiology cultures and other micro tests reviewed      Discussed with pt, GI - Dr Marta Vargas  F/u with Dr Marta Vargas after discharge  Nathalie Eng MD

## 2022-01-24 NOTE — PROGRESS NOTES
Patient is A&Ox4. VSS. 2 BM during night shift with no c/o abd cramping. Tums given for heart burn. Pt needs are satsified at this time.  Will con't to monitor

## 2022-01-24 NOTE — PLAN OF CARE
Problem: Discharge Planning:  Goal: Participates in care planning  Description: Participates in care planning  Outcome: Ongoing  Goal: Discharged to appropriate level of care  Description: Discharged to appropriate level of care  Outcome: Ongoing     Problem: Activity Intolerance:  Goal: Ability to tolerate increased activity will improve  Description: Ability to tolerate increased activity will improve  Outcome: Ongoing     Problem: Anxiety/Stress:  Goal: Level of anxiety will decrease  Description: Level of anxiety will decrease  Outcome: Ongoing     Problem: Bowel Function - Altered:  Goal: Bowel elimination is within specified parameters  Description: Bowel elimination is within specified parameters  Outcome: Ongoing     Problem: Fluid Volume - Deficit:  Goal: Absence of fluid volume deficit signs and symptoms  Description: Absence of fluid volume deficit signs and symptoms  Outcome: Ongoing  Goal: Electrolytes within specified parameters  Description: Electrolytes within specified parameters  Outcome: Ongoing     Problem:  Bowel/Gastric:  Goal: Occurrences of diarrhea will decrease  Description: Occurrences of diarrhea will decrease  1/24/2022 0918 by Lorenza Lucero RN  Outcome: Ongoing  1/24/2022 0513 by Darrian Galicia RN  Outcome: Met This Shift     Problem: Fluid Volume:  Goal: Will show no signs and symptoms of electrolyte imbalance  Description: Will show no signs and symptoms of electrolyte imbalance  Outcome: Ongoing     Problem: Physical Regulation:  Goal: Prevent transmision of infection  Description: Prevent transmision of infection  Outcome: Ongoing  Goal: Ability to avoid or minimize complications of infection will improve  Description: Ability to avoid or minimize complications of infection will improve  Outcome: Ongoing  Goal: Signs and symptoms of infection will decrease  Description: Signs and symptoms of infection will decrease  Outcome: Ongoing     Problem: Skin Integrity:  Goal: Risk for impaired skin integrity will decrease  Description: Risk for impaired skin integrity will decrease  Outcome: Ongoing     Problem: Falls - Risk of:  Goal: Will remain free from falls  Description: Will remain free from falls  Outcome: Ongoing  Goal: Absence of physical injury  Description: Absence of physical injury  Outcome: Ongoing

## 2022-01-24 NOTE — PLAN OF CARE
Problem:  Bowel/Gastric:  Goal: Occurrences of diarrhea will decrease  Description: Occurrences of diarrhea will decrease  1/24/2022 0513 by David Allen RN  Outcome: Met This Shift  1/23/2022 1705 by Lonnie Chandler RN  Outcome: Ongoing

## 2022-01-24 NOTE — PROGRESS NOTES
GI Progress Note      Óscar Stacy is a 80 y.o. male patient. 1. C. difficile diarrhea        Admit Date: 2022    Subjective:       Patient has had 4 BM since this am which were well formed. The frequency of his BMs has been decreasing over the past few days - he states that he had 6 yesterday, 7 day. Notes indicate between 12-14 over past couple days - half of which were well formed. His abdominal and rectal pain which he had prior are now resolved. He initially noticed some blood in stools but none over past few days. He is satisfied with his improving status. Denies chest pain, shortness of breath, vomiting. ROS:  As per above    Scheduled Meds:   magnesium sulfate  1,000 mg IntraVENous Once    cholestyramine light  4 g Oral Daily before lunch    allopurinol  300 mg Oral Nightly    aspirin  81 mg Oral Daily    vitamin B-12  50 mcg Oral QPM    sodium chloride flush  5-40 mL IntraVENous 2 times per day    enoxaparin  40 mg SubCUTAneous Daily    Fidaxomicin  200 mg Oral BID       Continuous Infusions:   sodium chloride         PRN Meds:  calcium carbonate, sodium chloride flush, sodium chloride, ondansetron **OR** ondansetron, polyethylene glycol, acetaminophen **OR** acetaminophen, Hydrocerin      Objective:       Patient Vitals for the past 24 hrs:   BP Temp Temp src Pulse Resp SpO2   22 0915 (!) 142/88 97.8 °F (36.6 °C) Oral 104 18 93 %   22 0357 135/80 98.4 °F (36.9 °C) Oral 103 18 95 %   22 2039 (!) 145/85 97.2 °F (36.2 °C) Oral 97 16 94 %   22 1649 (!) 164/82 98.2 °F (36.8 °C) Oral 97 21 96 %   22 1436 (!) 158/94 98 °F (36.7 °C) Oral 101 18 97 %       Exam:  VITALS:  BP (!) 142/88   Pulse 104   Temp 97.8 °F (36.6 °C) (Oral)   Resp 18   Ht 6' 0.01\" (1.829 m)   Wt 214 lb 15.2 oz (97.5 kg)   SpO2 93%   BMI 29.15 kg/m²   TEMPERATURE:  Current - Temp: 97.8 °F (36.6 °C);  Max - Temp  Av.9 °F (36.6 °C)  Min: 97.2 °F (36.2 °C)  Max: 98.4 °F (36.9 °C)      General appearance: alert, appears stated age, cooperative and no distress  Head: Normocephalic, without obvious abnormality, atraumatic  Neck: supple, symmetrical, trachea midline and thyroid not enlarged, symmetric, no tenderness/mass/nodules  CVS:  RRR, Nl s1s2  Lungs CTA Bilaterally, normal effort  Abdomen: soft, non-tender; bowel sounds normal; no masses,  no organomegaly  AAOx3, No asterixis or encephalopathy  Extremities: No edema. Recent Labs     01/22/22  1531 01/23/22  0656 01/24/22  0548   WBC 19.1* 20.8* 23.6*   HGB 10.6* 10.6* 10.1*   HCT 33.9* 33.3* 31.9*   MCV 92.0 90.8 91.2    141 142     Recent Labs     01/22/22  1531 01/23/22  0656 01/24/22  0548    144 141   K 4.2 4.5 4.5   * 113* 112*   CO2 24 26 27   BUN 23* 19 18   CREATININE 1.4* 1.3 1.3     No results for input(s): AST, ALT, ALB, BILIDIR, BILITOT, ALKPHOS in the last 72 hours. No results for input(s): LIPASE, AMYLASE in the last 72 hours. No results for input(s): PROT, INR in the last 72 hours. No results for input(s): PTT in the last 72 hours. No results for input(s): OCCULTBLD in the last 72 hours. Radiology review:    Assessment:       Recurrent severe C. Diff - Leukocytosis worsening to 23.6 from 20.8 yesterday. Clinically improving    Tested + 11/22, prescribed vancomycin but not taking as prescribed, tested positive 12/28, again taking vancomycin but per patient was having diarrhea.  Admitted 1/19 and started on fidoxamicin     Recommendations:       -Fidoxamicin 200 BID day #5/10  -Cholestyramine packets - 1 hour after any other PO meds, avoid PO meds for 2 hours following  -Consider fecal transplant if failure to improve      Dania Nayak MD PGY-1  1/24/2022  10:44 AM    Case to be discussed with Dr. Rene Tao MD

## 2022-01-25 VITALS
HEIGHT: 72 IN | DIASTOLIC BLOOD PRESSURE: 87 MMHG | TEMPERATURE: 96.8 F | HEART RATE: 93 BPM | OXYGEN SATURATION: 95 % | RESPIRATION RATE: 18 BRPM | BODY MASS INDEX: 29.11 KG/M2 | WEIGHT: 214.95 LBS | SYSTOLIC BLOOD PRESSURE: 143 MMHG

## 2022-01-25 LAB
ANION GAP SERPL CALCULATED.3IONS-SCNC: 4 MMOL/L (ref 3–16)
ANISOCYTOSIS: ABNORMAL
ATYPICAL LYMPHOCYTE RELATIVE PERCENT: 67 % (ref 0–6)
BASOPHILS ABSOLUTE: 0 K/UL (ref 0–0.2)
BASOPHILS RELATIVE PERCENT: 0 %
BUN BLDV-MCNC: 17 MG/DL (ref 7–20)
CALCIUM SERPL-MCNC: 9.1 MG/DL (ref 8.3–10.6)
CHLORIDE BLD-SCNC: 110 MMOL/L (ref 99–110)
CO2: 27 MMOL/L (ref 21–32)
CREAT SERPL-MCNC: 1.3 MG/DL (ref 0.8–1.3)
EOSINOPHILS ABSOLUTE: 0 K/UL (ref 0–0.6)
EOSINOPHILS RELATIVE PERCENT: 0 %
GFR AFRICAN AMERICAN: >60
GFR NON-AFRICAN AMERICAN: 53
GLUCOSE BLD-MCNC: 87 MG/DL (ref 70–99)
HCT VFR BLD CALC: 31.4 % (ref 40.5–52.5)
HEMATOLOGY PATH CONSULT: NO
HEMOGLOBIN: 10 G/DL (ref 13.5–17.5)
HYPOCHROMIA: ABNORMAL
LYMPHOCYTES ABSOLUTE: 17.9 K/UL (ref 1–5.1)
LYMPHOCYTES RELATIVE PERCENT: 13 %
MAGNESIUM: 1.6 MG/DL (ref 1.8–2.4)
MCH RBC QN AUTO: 29 PG (ref 26–34)
MCHC RBC AUTO-ENTMCNC: 31.8 G/DL (ref 31–36)
MCV RBC AUTO: 91.1 FL (ref 80–100)
MONOCYTES ABSOLUTE: 0.4 K/UL (ref 0–1.3)
MONOCYTES RELATIVE PERCENT: 2 %
NEUTROPHILS ABSOLUTE: 4 K/UL (ref 1.7–7.7)
NEUTROPHILS RELATIVE PERCENT: 18 %
OVALOCYTES: ABNORMAL
PDW BLD-RTO: 17.9 % (ref 12.4–15.4)
PLATELET # BLD: 129 K/UL (ref 135–450)
PLATELET SLIDE REVIEW: ABNORMAL
PMV BLD AUTO: 8.3 FL (ref 5–10.5)
POTASSIUM SERPL-SCNC: 4.3 MMOL/L (ref 3.5–5.1)
RBC # BLD: 3.44 M/UL (ref 4.2–5.9)
SODIUM BLD-SCNC: 141 MMOL/L (ref 136–145)
TEAR DROP CELLS: ABNORMAL
WBC # BLD: 22.4 K/UL (ref 4–11)

## 2022-01-25 PROCEDURE — 36415 COLL VENOUS BLD VENIPUNCTURE: CPT

## 2022-01-25 PROCEDURE — 80048 BASIC METABOLIC PNL TOTAL CA: CPT

## 2022-01-25 PROCEDURE — 85025 COMPLETE CBC W/AUTO DIFF WBC: CPT

## 2022-01-25 PROCEDURE — 83735 ASSAY OF MAGNESIUM: CPT

## 2022-01-25 PROCEDURE — 6370000000 HC RX 637 (ALT 250 FOR IP): Performed by: INTERNAL MEDICINE

## 2022-01-25 PROCEDURE — 99232 SBSQ HOSP IP/OBS MODERATE 35: CPT | Performed by: INTERNAL MEDICINE

## 2022-01-25 PROCEDURE — 6360000002 HC RX W HCPCS: Performed by: INTERNAL MEDICINE

## 2022-01-25 RX ADMIN — ASPIRIN 81 MG: 81 TABLET, COATED ORAL at 10:07

## 2022-01-25 RX ADMIN — FIDAXOMICIN 200 MG: 200 TABLET, FILM COATED ORAL at 18:00

## 2022-01-25 RX ADMIN — ENOXAPARIN SODIUM 40 MG: 100 INJECTION SUBCUTANEOUS at 10:10

## 2022-01-25 RX ADMIN — FIDAXOMICIN 200 MG: 200 TABLET, FILM COATED ORAL at 10:08

## 2022-01-25 ASSESSMENT — PAIN SCALES - GENERAL
PAINLEVEL_OUTOF10: 0

## 2022-01-25 NOTE — PROGRESS NOTES
Patient is A&O x4.  RA, sat 94%. No complaints of SOB. C/o mild abdominal cramping at times. Respirations appear to easy and unlabored. Lungs clear. Respirations easy with no complaints of cough. Patient still c/o loose stools. Up ad oscar to the bathroom/BSC as needed. Right FA PIV intact and flushed. C/o occasional indigestion, tums given as needed. Tolerating regular diet. Plan of care and safety measures reviewed with the patient. Call light in reach and bed alarm in place. Will continue to monitor.   Electronically signed by Pete Lacy RN on 1/25/2022 at 2:22 AM

## 2022-01-25 NOTE — PROGRESS NOTES
ID Follow-up NOTE    CC:   C difficile   Antibiotics: Fidaxomicin    Admit Date: 1/19/2022  Hospital Day: 7    Subjective:     Pt reports less mult stool, loose / soft, not watery. No assoc abd pain  Patient eating, has appetite      Objective:     Patient Vitals for the past 8 hrs:   BP Temp Temp src Pulse Resp SpO2   01/25/22 0432 (!) 155/90 98.2 °F (36.8 °C) Oral 107 18 92 %     I/O last 3 completed shifts: In: 910 [P.O.:800; I.V.:110]  Out: -   No intake/output data recorded. EXAM:  GENERAL: No apparent distress.     HEENT: Membranes moist, no oral lesion  NECK:  Supple, no lymphadenopathy  LUNGS: Clear b/l, no rales, no dullness  CARDIAC: RRR, no murmur appreciated  ABD:  + BS, soft / NT  EXT:  No rash, no edema, no lesions  NEURO: No focal neurologic findings  PSYCH: Orientation, sensorium, mood normal  LINES:  Peripheral iv       Data Review:  Lab Results   Component Value Date    WBC 22.4 (H) 01/25/2022    HGB 10.0 (L) 01/25/2022    HCT 31.4 (L) 01/25/2022    MCV 91.1 01/25/2022     (L) 01/25/2022     Lab Results   Component Value Date    CREATININE 1.3 01/25/2022    BUN 17 01/25/2022     01/25/2022    K 4.3 01/25/2022     01/25/2022    CO2 27 01/25/2022       Hepatic Function Panel:   Lab Results   Component Value Date    ALKPHOS 94 01/19/2022    ALT 21 01/19/2022    AST 53 01/19/2022    PROT 5.7 01/19/2022    BILITOT <0.2 01/19/2022    BILIDIR <0.2 01/19/2022    IBILI see below 01/19/2022    LABALBU 2.3 01/19/2022       Micro:  No new     11/22, 12/28, 1/18/22 C diff positive [[facility]]     Imaging:   None      Scheduled Meds:   [Held by provider] cholestyramine light  4 g Oral Daily before lunch    allopurinol  300 mg Oral Nightly    aspirin  81 mg Oral Daily    vitamin B-12  50 mcg Oral QPM    sodium chloride flush  5-40 mL IntraVENous 2 times per day    enoxaparin  40 mg SubCUTAneous Daily    Fidaxomicin  200 mg Oral BID       Continuous Infusions:   sodium chloride         PRN Meds:  calcium carbonate, sodium chloride flush, sodium chloride, ondansetron **OR** ondansetron, polyethylene glycol, acetaminophen **OR** acetaminophen, Hydrocerin      Assessment:     81 yo man with hx HTN, mantle cell lymphoma, nephrolithiasis, FAM     Pt has hx C diff, dx 11/22, 12/28, 1/18/22  Onset diarrhea, mult episodes, loose stool in early November   No prior antibiotics by pt hx. No blood, no fever.     Seen, + test 11/22, rx po vancomycin, did not take vancomycin as prescribed (had to travel to appointment and concerned by having BM). Completed vanco in early December. Pt reports he never had improvement in number and character of stool. Retested in 12/28, C diff pos, restarted po vanco and this time, he took med 5x/d. Again, no improvement. He called GI (followed by  Dr Marta Vargas, [facility]) and referred to [facility]     In ED 1/19, afeb, WBC 18, Cr 1.6  Admit and started on po vancomycin 125 mg qid     Seem 1/20, changed to Fidaxomicin    IMP/  Recurrent, non-resolving C diff  DORINDA - Cr down, 1.4 today  Leukocytosis - WBC 21.5 today    Plan:     Cont fidaxomicin 200 bid, d#6, complete 10 d  Cont pyllium  IVF, electrolytes, renal per Medical team    Medical Decision Making:   The following items were considered in medical decision making:  Discussion of patient care with other providers  Reviewed clinical lab tests  Reviewed radiology tests  Reviewed other diagnostic tests/interventions  Microbiology cultures and other micro tests reviewed      Discussed with pt, GI - Dr Marta Vargas  F/u with Dr Marta Vargas after discharge  Nathalie Eng MD

## 2022-01-25 NOTE — PROGRESS NOTES
GI Progress Note      Aida Cornejo is a 80 y.o. male patient. 1. C. difficile diarrhea        Admit Date: 2022    Subjective:       Patient has had 5 BM so far this am, which were well formed. He had some nausea for about an hour 4-5 am, though did not require or ask for antiemetic medications. He denies any abdominal pain. Has good appetite with PO intake. He is concerned about fecal incontinence due to living an hour away. He states that he had not eaten for ~50 hours prior to coming to the hospital, and fears will be incontinent of stool on drive back home. Additionally endorses polyuria ON. Remains afebrile with slight tachycardia (100-110). WBC slightly downtrending from 23.6 -> 22.4.       ROS:  As per above    Scheduled Meds:   [Held by provider] cholestyramine light  4 g Oral Daily before lunch    allopurinol  300 mg Oral Nightly    aspirin  81 mg Oral Daily    vitamin B-12  50 mcg Oral QPM    sodium chloride flush  5-40 mL IntraVENous 2 times per day    enoxaparin  40 mg SubCUTAneous Daily    Fidaxomicin  200 mg Oral BID       Continuous Infusions:   sodium chloride         PRN Meds:  calcium carbonate, sodium chloride flush, sodium chloride, ondansetron **OR** ondansetron, polyethylene glycol, acetaminophen **OR** acetaminophen, Hydrocerin      Objective:       Patient Vitals for the past 24 hrs:   BP Temp Temp src Pulse Resp SpO2   22 0432 (!) 155/90 98.2 °F (36.8 °C) Oral 107 18 92 %   22 2042 (!) 143/93 98.1 °F (36.7 °C) Oral 95 18 95 %   22 1800 (!) 149/85     94 %   22 1645 (!) 158/92   97  93 %       Exam:  VITALS:  BP (!) 155/90   Pulse 107   Temp 98.2 °F (36.8 °C) (Oral)   Resp 18   Ht 6' 0.01\" (1.829 m)   Wt 214 lb 15.2 oz (97.5 kg)   SpO2 92%   BMI 29.15 kg/m²   TEMPERATURE:  Current - Temp: 98.2 °F (36.8 °C);  Max - Temp  Av.2 °F (36.8 °C)  Min: 98.1 °F (36.7 °C)  Max: 98.2 °F (36.8 °C)      General appearance: alert, appears stated age, cooperative and no distress  Head: Normocephalic, without obvious abnormality, atraumatic  Neck: supple, symmetrical, trachea midline and thyroid not enlarged, symmetric, no tenderness/mass/nodules  CVS:  RRR, Nl s1s2  Lungs CTA Bilaterally, normal effort  Abdomen: soft, non-tender; bowel sounds normal; no masses,  no organomegaly  AAOx3, No asterixis or encephalopathy  Extremities: No edema. Recent Labs     01/23/22  0656 01/24/22  0548 01/25/22  0638   WBC 20.8* 23.6* 22.4*   HGB 10.6* 10.1* 10.0*   HCT 33.3* 31.9* 31.4*   MCV 90.8 91.2 91.1    142 129*     Recent Labs     01/23/22  0656 01/24/22  0548 01/25/22  0638    141 141   K 4.5 4.5 4.3   * 112* 110   CO2 26 27 27   BUN 19 18 17   CREATININE 1.3 1.3 1.3     No results for input(s): AST, ALT, ALB, BILIDIR, BILITOT, ALKPHOS in the last 72 hours. No results for input(s): LIPASE, AMYLASE in the last 72 hours. No results for input(s): PROT, INR in the last 72 hours. No results for input(s): PTT in the last 72 hours. No results for input(s): OCCULTBLD in the last 72 hours. Radiology review:    Assessment:       Recurrent severe C. Diff - Leukocytosis worsening to 23.6 from 20.8 yesterday. Clinically improving    Tested + 11/22, prescribed vancomycin but not taking as prescribed, tested positive 12/28, again taking vancomycin but per patient was having diarrhea.  Admitted 1/19 and started on fidoxamicin     Recommendations:       -Fidoxamicin 200 BID day #6/10  -Cholestyramine packets - on hold  -Consider fecal transplant if failure to improve      Wilson Kwon MD PGY-1  1/25/2022  11:29 AM    Case to be discussed with Dr. Ramandeep Walters MD

## 2022-01-25 NOTE — CARE COORDINATION
Pt DC home today on 4 more days of PO dificid per Dr. Kerrie Avery. SW discussed cost with Sandra Garrison, Director if Case Management as Pt cannot afford cost - she spoke with Pharmacy Director, Melly Barron who stated hospital cost for 4 days worth is $807.41 so can bill Pharmacy voucher at this amount. Glenny Barbara agreed to this amount for Pharmacy Voucher. CJ met with Pt and discussed voucher and cost - Pt signed form and appreciative. Voucher sent to [phone] [facility] [address] and staff RN aware of plan.     Sj Maldonado [place]  Case Management  [phone]

## 2022-01-27 NOTE — PROGRESS NOTES
Physician Progress Note      PATIENT:               Sera Sawyer  CSN #:                  [id number]  :                       1940  ADMIT DATE:       2022 10:24 PM  [address] DATE:        2022 6:36 PM  RESPONDING  PROVIDER #:        Edward Turpin TEXT:    Patient admitted  with sepsis per query answer of . and PN . Sepsis not documented following  PN. If possible, please document in the   progress notes and discharge summary if sepsis was: The medical record reflects the following:  Risk Factors: 80 y.o. male w/recurrent C-diff, HTN, CKD, PMH NHL  Clinical Indicators: noted to have WBC: 18.0 and HR: 102 on presentation. C.diff positive  @ [facility]  Treatment: Vancomycin initially, Fidaxomicin for c-diff  Options provided:  -- Sepsis confirmed POA after study  -- Sepsis ruled out after study  -- Other - I will add my own diagnosis  -- Disagree - Not applicable / Not valid  -- Disagree - Clinically unable to determine / Unknown  -- Refer to Clinical Documentation Reviewer    PROVIDER RESPONSE TEXT:    Provider disagreed with this query. Pt not septic during his admission.     Query created by: Brigette Tapia on 2022 3:59 PM      Electronically signed by:  Eloisa Robert 2022 4:09 PM

## 2022-02-04 ENCOUNTER — HOSPITAL ENCOUNTER (OUTPATIENT)
Age: 82
Discharge: HOME | End: 2022-02-04
Payer: MEDICARE

## 2022-02-04 PROCEDURE — U0005 INFEC AGEN DETEC AMPLI PROBE: HCPCS

## 2022-02-04 PROCEDURE — U0003 INFECTIOUS AGENT DETECTION BY NUCLEIC ACID (DNA OR RNA); SEVERE ACUTE RESPIRATORY SYNDROME CORONAVIRUS 2 (SARS-COV-2) (CORONAVIRUS DISEASE [COVID-19]), AMPLIFIED PROBE TECHNIQUE, MAKING USE OF HIGH THROUGHPUT TECHNOLOGIES AS DESCRIBED BY CMS-2020-01-R: HCPCS

## 2022-02-04 NOTE — DISCHARGE SUMMARY
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 difficile diarrhea [A04.72]        The patient was seen and examined on day of discharge and this discharge summary is in conjunction with any daily progress note from day of discharge. Hospital Course: Pt had a six day hospital stay. He was admitted for C.diff infection. He was seen by   GI and prescribed dificid. According to GI he responded well to the dificid and was able to be discharged to home on day six with more days of therapy after discharge. He did have an elevated WBC count throughout his hospitalization, but GI was not concerned about his. Pt continued to have  5-6 watery diarrhea stools aday. Pt was eating well and taking fluids during his admission. Physical Exam Performed:     BP (!) 143/87   Pulse 93   Temp 96.8 °F (36 °C) (Oral)   Resp 18   Ht 6' 0.01\" (1.829 m)   Wt 214 lb 15.2 oz (97.5 kg)   SpO2 95%   BMI 29.15 kg/m²       General appearance:  No apparent distress, appears stated age and cooperative. HEENT:  Normal cephalic, atraumatic without obvious deformity. Pupils equal, round, and reactive to light. Extra ocular muscles intact. Conjunctivae/corneas clear. Neck: Supple, with full range of motion. No jugular venous distention. Trachea midline. Respiratory:  Normal respiratory effort. Clear to auscultation, bilaterally without Rales/Wheezes/Rhonchi. Cardiovascular:  Regular rate and rhythm with normal S1/S2 without murmurs, rubs or gallops.   Abdomen: Soft, non-tender, non-distended with normal bowel sounds. Musculoskeletal:  No clubbing, cyanosis or edema bilaterally. Full range of motion without deformity. Skin: Skin color, texture, turgor normal.  No rashes or lesions. Neurologic:  Neurovascularly intact without any focal sensory/motor deficits. Cranial nerves: II-XII intact, grossly non-focal.  Psychiatric:  Alert and oriented, thought content appropriate, normal insight  Capillary Refill: Brisk,< 3 seconds   Peripheral Pulses: +2 palpable, equal bilaterally       Labs:  For convenience and continuity at follow-up the following most recent labs are provided:      CBC:    Lab Results   Component Value Date    WBC 22.4 01/25/2022    HGB 10.0 01/25/2022    HCT 31.4 01/25/2022     01/25/2022       Renal:    Lab Results   Component Value Date     01/25/2022    K 4.3 01/25/2022    K 4.2 01/21/2022     01/25/2022    CO2 27 01/25/2022    BUN 17 01/25/2022    CREATININE 1.3 01/25/2022    CALCIUM 9.1 01/25/2022    PHOS 3.2 04/06/2015         Significant Diagnostic Studies    Radiology:   No orders to display          Consults:     IP CONSULT TO HOSPITALIST  IP CONSULT TO INFECTIOUS DISEASES  IP CONSULT TO GI  IP CONSULT TO SOCIAL WORK    Disposition:  home    Condition at Discharge: Stable    Discharge Instructions/Follow-up:     Code Status:  Prior     Activity: activity as tolerated    Diet: regular diet      Discharge Medications:     Discharge Medication List as of 1/25/2022  4:58 PM           Details   Fidaxomicin (DIFICID) 200 MG TABS tablet Take 200 mg by mouth 2 times daily for 4 days, Disp-8 tablet, R-0NO PRINT              Details   ferrous sulfate (IRON 325) 325 (65 Fe) MG tablet Take 325 mg by mouth 2 times daily (with meals)Historical Med      lisinopril (PRINIVIL;ZESTRIL) 20 MG tablet Take 20 mg by mouth dailyHistorical Med      timolol (TIMOPTIC) 0.5 % ophthalmic solution SMARTSIG:In Eye(s)Historical Med      colestipol (COLESTID) 1 g tablet Take 1 g by mouth 2 times dailyHistorical Med      KRILL OIL PO Take 2 tablets by mouth every evening. Historical Med      oxyCODONE-acetaminophen (ROXICET) 5-325 MG per tablet Take 1 tablet by mouth every 4 hours as needed. Historical Med      phenazopyridine (PYRIDIUM) 200 MG tablet Take 200 mg by mouth three times daily. Historical Med      omeprazole (PRILOSEC) 20 MG capsule Take 20 mg by mouth every evening. Historical Med      aspirin 81 MG tablet Take 81 mg by mouth daily. Stopped for surgeryHistorical Med      multivitamin (THERAGRAN) per tablet Take 1 tablet by mouth every evening. Historical Med      GARLIC Take 1 tablet by mouth every evening. Historical Med      vitamin B-12 (CYANOCOBALAMIN) 100 MCG tablet Take 50 mcg by mouth every evening. Historical Med      Coral Calcium 500 MG TABS Take 1 tablet by mouth daily. Historical Med      atenolol (TENORMIN) 50 MG tablet Take 50 mg by mouth nightly. Historical Med      allopurinol (ZYLOPRIM) 300 MG tablet Take 300 mg by mouth nightly. Historical Med      Coenzyme Q10 (CO Q 10) 10 MG CAPS Take 1 capsule by mouth every evening. Historical Med      Glucosamine-Chondroit-Vit C-Mn (GLUCOSAMINE 1500 COMPLEX PO) Take 1 capsule by mouth every evening. Historical Med      Omega-3 Fatty Acids (FISH OIL) 1000 MG CAPS Take 3,000 mg by mouth every evening. Stopped for surgeryHistorical Med      zinc gluconate 50 MG tablet Take 50 mg by mouth as needed. Historical Med      Echinacea 380 MG CAPS Take 1 capsule by mouth as needed. Historical Med      Lysine 500 MG TABS Take 1 tablet by mouth as needed. Historical Med      niacin 125 MG CR capsule Take 500 mg by mouth every evening. Historical Med      clonazepam (KLONOPIN) 0.5 MG tablet Take 0.5 mg by mouth as needed. Historical Med             Time Spent on discharge is more than 45 minutes in the examination, evaluation, counseling and review of medications and discharge plan.       Arvind Allen DO   2/3/2022      Thank you Klarissa Morales Kelley Romero MD for the opportunity to be involved in this patient's care. If you have any questions or concerns please feel free to contact me at [phone].

## 2022-02-06 LAB — SARS-COV-2, PCR: NOT DETECTED

## 2022-02-08 ENCOUNTER — ANESTHESIA (OUTPATIENT)
Dept: ENDOSCOPY | Age: 82
End: 2022-02-08
Payer: MEDICARE

## 2022-02-08 ENCOUNTER — HOSPITAL ENCOUNTER (OUTPATIENT)
Age: 82
Setting detail: OUTPATIENT SURGERY
Discharge: HOME | End: 2022-02-08
Attending: INTERNAL MEDICINE | Admitting: INTERNAL MEDICINE
Payer: MEDICARE

## 2022-02-08 ENCOUNTER — ANESTHESIA EVENT (OUTPATIENT)
Dept: ENDOSCOPY | Age: 82
End: 2022-02-08
Payer: MEDICARE

## 2022-02-08 VITALS — OXYGEN SATURATION: 76 % | SYSTOLIC BLOOD PRESSURE: 139 MMHG | DIASTOLIC BLOOD PRESSURE: 76 MMHG

## 2022-02-08 VITALS
TEMPERATURE: 98.4 F | OXYGEN SATURATION: 95 % | RESPIRATION RATE: 18 BRPM | WEIGHT: 230 LBS | HEART RATE: 96 BPM | BODY MASS INDEX: 31.15 KG/M2 | SYSTOLIC BLOOD PRESSURE: 111 MMHG | DIASTOLIC BLOOD PRESSURE: 67 MMHG | HEIGHT: 72 IN

## 2022-02-08 DIAGNOSIS — K90.89 BILE SALT-INDUCED DIARRHEA: ICD-10-CM

## 2022-02-08 PROCEDURE — 88341 IMHCHEM/IMCYTCHM EA ADD ANTB: CPT

## 2022-02-08 PROCEDURE — 6360000002 HC RX W HCPCS: Performed by: NURSE ANESTHETIST, CERTIFIED REGISTERED

## 2022-02-08 PROCEDURE — 2709999900 HC NON-CHARGEABLE SUPPLY: Performed by: INTERNAL MEDICINE

## 2022-02-08 PROCEDURE — 3700000000 HC ANESTHESIA ATTENDED CARE: Performed by: INTERNAL MEDICINE

## 2022-02-08 PROCEDURE — 2500000003 HC RX 250 WO HCPCS: Performed by: NURSE ANESTHETIST, CERTIFIED REGISTERED

## 2022-02-08 PROCEDURE — 88342 IMHCHEM/IMCYTCHM 1ST ANTB: CPT

## 2022-02-08 PROCEDURE — 88360 TUMOR IMMUNOHISTOCHEM/MANUAL: CPT

## 2022-02-08 PROCEDURE — 3700000001 HC ADD 15 MINUTES (ANESTHESIA): Performed by: INTERNAL MEDICINE

## 2022-02-08 PROCEDURE — 2580000003 HC RX 258: Performed by: INTERNAL MEDICINE

## 2022-02-08 PROCEDURE — 7100000011 HC PHASE II RECOVERY - ADDTL 15 MIN: Performed by: INTERNAL MEDICINE

## 2022-02-08 PROCEDURE — 88305 TISSUE EXAM BY PATHOLOGIST: CPT

## 2022-02-08 PROCEDURE — 3609012400 HC EGD TRANSORAL BIOPSY SINGLE/MULTIPLE: Performed by: INTERNAL MEDICINE

## 2022-02-08 PROCEDURE — 7100000010 HC PHASE II RECOVERY - FIRST 15 MIN: Performed by: INTERNAL MEDICINE

## 2022-02-08 PROCEDURE — 6370000000 HC RX 637 (ALT 250 FOR IP): Performed by: ANESTHESIOLOGY

## 2022-02-08 RX ORDER — IPRATROPIUM BROMIDE AND ALBUTEROL SULFATE 2.5; .5 MG/3ML; MG/3ML
SOLUTION RESPIRATORY (INHALATION)
Status: DISCONTINUED
Start: 2022-02-08 | End: 2022-02-08 | Stop reason: HOSPADM

## 2022-02-08 RX ORDER — LIDOCAINE HYDROCHLORIDE 20 MG/ML
INJECTION, SOLUTION EPIDURAL; INFILTRATION; INTRACAUDAL; PERINEURAL PRN
Status: DISCONTINUED | OUTPATIENT
Start: 2022-02-08 | End: 2022-02-08 | Stop reason: SDUPTHER

## 2022-02-08 RX ORDER — SODIUM CHLORIDE, SODIUM LACTATE, POTASSIUM CHLORIDE, CALCIUM CHLORIDE 600; 310; 30; 20 MG/100ML; MG/100ML; MG/100ML; MG/100ML
INJECTION, SOLUTION INTRAVENOUS ONCE
Status: COMPLETED | OUTPATIENT
Start: 2022-02-08 | End: 2022-02-08

## 2022-02-08 RX ORDER — PROPOFOL 10 MG/ML
INJECTION, EMULSION INTRAVENOUS PRN
Status: DISCONTINUED | OUTPATIENT
Start: 2022-02-08 | End: 2022-02-08 | Stop reason: SDUPTHER

## 2022-02-08 RX ORDER — LIDOCAINE HYDROCHLORIDE 10 MG/ML
0.1 INJECTION, SOLUTION EPIDURAL; INFILTRATION; INTRACAUDAL; PERINEURAL
Status: DISCONTINUED | OUTPATIENT
Start: 2022-02-08 | End: 2022-02-08 | Stop reason: HOSPADM

## 2022-02-08 RX ORDER — IPRATROPIUM BROMIDE AND ALBUTEROL SULFATE 2.5; .5 MG/3ML; MG/3ML
1 SOLUTION RESPIRATORY (INHALATION) ONCE
Status: COMPLETED | OUTPATIENT
Start: 2022-02-08 | End: 2022-02-08

## 2022-02-08 RX ADMIN — PROPOFOL 100 MG: 10 INJECTION, EMULSION INTRAVENOUS at 12:07

## 2022-02-08 RX ADMIN — LIDOCAINE HYDROCHLORIDE 60 MG: 20 INJECTION, SOLUTION EPIDURAL; INFILTRATION; INTRACAUDAL; PERINEURAL at 12:07

## 2022-02-08 RX ADMIN — IPRATROPIUM BROMIDE AND ALBUTEROL SULFATE 1 AMPULE: .5; 2.5 SOLUTION RESPIRATORY (INHALATION) at 12:43

## 2022-02-08 RX ADMIN — PROPOFOL 50 MG: 10 INJECTION, EMULSION INTRAVENOUS at 12:14

## 2022-02-08 RX ADMIN — SODIUM CHLORIDE, POTASSIUM CHLORIDE, SODIUM LACTATE AND CALCIUM CHLORIDE: 600; 310; 30; 20 INJECTION, SOLUTION INTRAVENOUS at 11:49

## 2022-02-08 ASSESSMENT — PAIN - FUNCTIONAL ASSESSMENT: PAIN_FUNCTIONAL_ASSESSMENT: 0-10

## 2022-02-08 ASSESSMENT — PAIN SCALES - GENERAL
PAINLEVEL_OUTOF10: 0
PAINLEVEL_OUTOF10: 0

## 2022-02-08 NOTE — ANESTHESIA POSTPROCEDURE EVALUATION
Department of Anesthesiology  Postprocedure Note    Patient: Dalton Hernandez  MRN: [id number]  YOB: 1940  Date of evaluation: 2/8/2022  Time:  2:22 PM     Procedure Summary     Date: 02/08/22 Room / Location: [address] [place] [address]    Anesthesia Start: 4987 Anesthesia Stop: 458    Procedure: EGD BIOPSY (N/A ) Diagnosis:       Bile salt-induced diarrhea      (BILE SALT INDUCED DIARRHEA)    Surgeons: Main Marie MD Responsible Provider: Fritz Monson MD    Anesthesia Type: general ASA Status: 3          Anesthesia Type: general    Camelia Phase I: Camelia Score: 10    Camelia Phase II: Camelia Score: 10    Last vitals: Reviewed and per EMR flowsheets.        Anesthesia Post Evaluation    Comments: Postoperative Anesthesia Note    Name:    Dalton Hernandez  MRN:      [id number]    Patient Vitals in the past 12 hrs:  02/08/22 1317, SpO2:95 %  02/08/22 1308, BP:111/67, Pulse:96, Resp:18, SpO2:94 %  02/08/22 1305, SpO2:95 %  02/08/22 1302, BP:106/64, Temp:98.4 °F (36.9 °C), Pulse:92, Resp:18, SpO2:94 %  02/08/22 1255, BP:112/61, SpO2:95 %  02/08/22 1251, BP:112/61, Temp:98.5 °F (36.9 °C), Pulse:95, SpO2:100 %  02/08/22 1250, SpO2:98 %  02/08/22 1245, BP:111/66, Pulse:95, Resp:20, SpO2:94 %  02/08/22 1240, BP:112/65, SpO2:94 %  02/08/22 1235, BP:114/64, Pulse:94, Resp:18, SpO2:94 %  02/08/22 1228, BP:120/63, Temp:99.2 °F (37.3 °C), Pulse:95, Resp:18, SpO2:94 %  02/08/22 1225, BP:120/63, Temp:99.2 °F (37.3 °C), Pulse:96, Resp:18, SpO2:92 %  02/08/22 1141, BP:122/74, Temp:99.1 °F (37.3 °C), Temp src:Temporal, Pulse:95, Resp:20, SpO2:92 %, Height:6' (1.829 m), Weight:230 lb (104.3 kg)     LABS:    CBC  Lab Results       Component                Value               Date/Time                  WBC                      22.4 (H)            01/25/2022 06:38 AM        HGB                      10.0 (L)            01/25/2022 06:38 AM        HCT                      31.4 (L) 01/25/2022 06:38 AM        PLT                      129 (L)             01/25/2022 06:38 AM   RENAL  Lab Results       Component                Value               Date/Time                  NA                       141                 01/25/2022 06:38 AM        K                        4.3                 01/25/2022 06:38 AM        K                        4.2                 01/21/2022 06:57 AM        CL                       110                 01/25/2022 06:38 AM        CO2                      27                  01/25/2022 06:38 AM        BUN                      17                  01/25/2022 06:38 AM        CREATININE               1.3                 01/25/2022 06:38 AM        GLUCOSE                  87                  01/25/2022 06:38 AM   COAGS  Lab Results       Component                Value               Date/Time                  PROTIME                  11.2                12/10/2013 08:32 AM        INR                      1.00                12/10/2013 08:32 AM        APTT                     30.0                12/10/2013 08:32 AM     Intake & Output:  @23NILQ@    Nausea & Vomiting:  No    Level of Consciousness:  Awake    Pain Assessment:  Adequate analgesia    Anesthesia Complications:  No apparent anesthetic complications    SUMMARY      Vital signs stable  OK to discharge from Stage I post anesthesia care.   Care transferred from Anesthesiology department on discharge from perioperative area

## 2022-02-08 NOTE — PROGRESS NOTES
Pt admitted to [facility] from [facility] , report given per crna that pt was having difficulty maintaining oxygenation above 90 without O2 being at 6 liters a min. Order for breathing treatment of duoneb obtained. Pt head of bed elevated . Pt. Tolerating breathing treatment well.

## 2022-02-08 NOTE — PROCEDURES
Endoscopy Note    Patient: Lola Ruskin  : 1940  CSN:     Procedure: Esophagogastroduodenoscopy with biopsy    Date:  2022     Surgeon:  Hazel Salas MD     Referring Physician:  Dr. Cherelle Torres, Dr. Cintia James MD    Preoperative Diagnosis:  Anemia    Postoperative Diagnosis:  Duodenal ulcerated polyps, nodular gastritis, esophagitis    Anesthesia:  Propofol    EBL: <5 mL    Indications: This is a 80y.o. year old male who presents today with Unexplained iron deficiency anaemia. Description of Procedure:  Informed consent was obtained from the patient after explanation of indications, benefits and possible risks and complications of the procedure. The patient was then taken to the endoscopy suite, placed in the left lateral decubitus position and the above IV sedation was administrered. The Olympus videoendoscope was passed through the hypopharynx into the esophagus. The scope was advanced all the way to the duodenum. The mucosa in the duodenal bulb, post bulbar region in the descending duodenum appeared to have several small polyps, several of the surfaces of these polyps were ulcerated. No active bleeding was seen. Several duodenal biopsies were taken. The mucosa in the antrum and body of the stomach appeared nodular and erythematous with hematin spots suggestive of gastritis, biopsies were taken randomly throughout the antrum and body. Upon retroflexion more of this nodular appearing mucosa was noticed. No ulcer, erosions or AVM's were seen. The GE junction was at around 38 cms. A 1cm hiatus hernia was noticed. Erythema was visualized above the GE Junction, biopsies were taken to rule out patel's esophagitis. The mucosa in the remainder of the esophagus appeared normal.  The scope was withdrawn and the procedure was terminated.     Gastric or Duodenal ulcer present: No      The patient tolerated the procedure well and was taken to the post anesthesia care unit in good condition. Impression:  -Several small polyps seen in small bowel, some of which had ulcerated surfaces, nodular gastric mucosa, esophagitis. Recommendations: -Await pathology. Follow up with Dr. Alysia Bradley. Avoid NSAIDS. Addendum: Patient informed today that esophageal, stomach and duodenal biopsied have all shown Mantle Cell Lymphoma, have informed Dr. Alysia Bradley, will forward to Dr. Kalie Ceballos and Dr Chacha Treadwell.     Wing Griselda MD, MD  [facility]  [phone]

## 2022-02-08 NOTE — PROGRESS NOTES
Pt states ready to go home. Discharge instructions given to patient and responsible party. Verbalized understanding.

## 2022-02-08 NOTE — H&P
Gastroenterology Note             Pre-operative History and Physical    Patient: Elizabeth Hdz  : 1940  CSN:     History Obtained From:  patient and/or guardian. HISTORY OF PRESENT ILLNESS:    The patient is a 80 y.o. male  here for EGD    Past Medical History:    Past Medical History:   Diagnosis Date    Acid reflux     Anesthesia complication     severe sore throat after last 2 surgeries    Bladder stone     current    BPH (benign prostatic hyperplasia)     BPH (benign prostatic hypertrophy) with urinary obstruction 12/10/2013    CAD (coronary artery disease)     GERD (gastroesophageal reflux disease)     Grade II hemorrhoids     H/O arthroscopy of left knee     x3    History of arthroscopy of right knee     x3    Hypertension     Iron deficiency anemia     Kidney stones     pt states has had 33 kidney stones    Mantle cell lymphoma of extranodal and solid organ sites [facility])     colon    Sleep apnea     mild no c-pap needed     Past Surgical History:    Past Surgical History:   Procedure Laterality Date    CARDIAC SURGERY      CHOLECYSTECTOMY      lap choley    COLONOSCOPY      HERNIA REPAIR  laporoscopic    umbilical with mesh    JOINT REPLACEMENT      bilateral knees    KNEE ARTHROSCOPY Bilateral     right x 3,left x3    MITRAL VALVE REPAIR      PROSTATECTOMY N/A 12/10/13    suprapubic prostatectomy/open bladder stone removal    SHOULDER ARTHROSCOPY       Medications Prior to Admission:   No current facility-administered medications on file prior to encounter. Current Outpatient Medications on File Prior to Encounter   Medication Sig Dispense Refill    Esomeprazole Magnesium (NEXIUM PO) Take by mouth daily      aspirin 81 MG tablet Take 81 mg by mouth daily       GARLIC Take 1 tablet by mouth every evening.  atenolol (TENORMIN) 50 MG tablet Take 50 mg by mouth nightly.  allopurinol (ZYLOPRIM) 300 MG tablet Take 300 mg by mouth nightly.       KRILL OIL PO Take 2 tablets by mouth every evening.  multivitamin (THERAGRAN) per tablet Take 1 tablet by mouth every evening.  vitamin B-12 (CYANOCOBALAMIN) 100 MCG tablet Take 50 mcg by mouth every evening.  Coral Calcium 500 MG TABS Take 1 tablet by mouth daily.  Coenzyme Q10 (CO Q 10) 10 MG CAPS Take 1 capsule by mouth every evening.  Glucosamine-Chondroit-Vit C-Mn (GLUCOSAMINE 1500 COMPLEX PO) Take 1 capsule by mouth every evening.  Omega-3 Fatty Acids (FISH OIL) 1000 MG CAPS Take 1,000 mg by mouth every evening       zinc gluconate 50 MG tablet Take 50 mg by mouth as needed.  Echinacea 380 MG CAPS Take 1 capsule by mouth as needed.  Lysine 500 MG TABS Take 1 tablet by mouth as needed. Allergies:  Butorphanol      Social History:   Social History     Tobacco Use    Smoking status: Never Smoker    Smokeless tobacco: Never Used   Substance Use Topics    Alcohol use: Yes     Alcohol/week: 0.8 standard drinks     Types: 1 Standard drinks or equivalent per week     Comment: 2-3 drinks per year     Family History:   Family History   Problem Relation Age of Onset    No Known Problems Mother     Heart Attack Father     Alzheimer's Disease Brother     Liver Disease Brother        PHYSICAL EXAM:      /74   Pulse 95   Temp 99.1 °F (37.3 °C) (Temporal)   Resp 20   Ht 6' (1.829 m)   Wt 230 lb (104.3 kg)   SpO2 92%   BMI 31.19 kg/m²  I        Heart:   RRR, normal s1s2    Lungs:  CTA bilat,  Normal effort    Abdomen:   NT, ND      ASA Grade:  ASA 3 - Patient with moderate systemic disease with functional limitations    Mallampati Class: 3          ASSESSMENT AND PLAN:    1. Patient is a 80 y.o. male here for EGD  2. Procedure options, risks and benefits reviewed with patient. Patient expresses understanding.     Osmany May MD,   [address]  2/8/2022

## 2022-02-08 NOTE — ANESTHESIA PRE PROCEDURE
Department of Anesthesiology  Preprocedure Note       Name:  Arabella Vora   Age:  80 y.o.  :  1940                                          MRN:  [id number]         Date:  2022      Surgeon: Martin Graham):  Pebbles Flores MD    Procedure: Procedure(s):  EGD -SLEEP APNEA    Medications prior to admission:   Prior to Admission medications    Medication Sig Start Date End Date Taking? Authorizing Provider   Esomeprazole Magnesium (NEXIUM PO) Take by mouth daily   Yes Historical Provider, MD   lisinopril (PRINIVIL;ZESTRIL) 20 MG tablet Take 20 mg by mouth daily 21  Yes Historical Provider, MD   timolol (TIMOPTIC) 0.5 % ophthalmic solution Place 1 drop into both eyes 2 times daily  10/28/21  Yes Historical Provider, MD   aspirin 81 MG tablet Take 81 mg by mouth daily    Yes Historical Provider, MD   GARLIC Take 1 tablet by mouth every evening. Yes Historical Provider, MD   atenolol (TENORMIN) 50 MG tablet Take 50 mg by mouth nightly. Yes Historical Provider, MD   allopurinol (ZYLOPRIM) 300 MG tablet Take 300 mg by mouth nightly. Yes Historical Provider, MD   KRILL OIL PO Take 2 tablets by mouth every evening. Historical Provider, MD   multivitamin [facility]) per tablet Take 1 tablet by mouth every evening. Historical Provider, MD   vitamin B-12 (CYANOCOBALAMIN) 100 MCG tablet Take 50 mcg by mouth every evening. Historical Provider, MD   Coral Calcium 500 MG TABS Take 1 tablet by mouth daily. Historical Provider, MD   Coenzyme Q10 (CO Q 10) 10 MG CAPS Take 1 capsule by mouth every evening. Historical Provider, MD   Glucosamine-Chondroit-Vit C-Mn (GLUCOSAMINE 1500 COMPLEX PO) Take 1 capsule by mouth every evening. Historical Provider, MD   Omega-3 Fatty Acids (FISH OIL) 1000 MG CAPS Take 1,000 mg by mouth every evening     Historical Provider, MD   zinc gluconate 50 MG tablet Take 50 mg by mouth as needed.     Historical Provider, MD   Echinacea 380 MG CAPS Take 1 capsule by mouth as needed. Historical Provider, MD   Lysine 500 MG TABS Take 1 tablet by mouth as needed. Historical Provider, MD       Current medications:    Current Facility-Administered Medications   Medication Dose Route Frequency Provider Last Rate Last Admin    lidocaine PF 1 % injection 0.1 mL  0.1 mL IntraDERmal Once PRN Tone Hutchinson MD           Allergies:     Allergies   Allergen Reactions    Butorphanol Other (See Comments) and Rash     Shakes,tremors-severe  Extreme tremors  Shakes,tremors-severe  Extreme tremors  BODY SHAKING         Problem List:    Patient Active Problem List   Diagnosis Code    Essential hypertension, benign I10    Mitral valve disorder I05.9    Nonspecific abnormal results of cardiovascular function study R94.30    Benign prostatic hyperplasia with urinary obstruction N40.1, N13.8    Colitis K52.9    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 difficile diarrhea A04.72       Past Medical History:        Diagnosis Date    Acid reflux     Anesthesia complication     severe sore throat after last 2 surgeries    Bladder stone     current    BPH (benign prostatic hyperplasia)     BPH (benign prostatic hypertrophy) with urinary obstruction 12/10/2013    CAD (coronary artery disease)     GERD (gastroesophageal reflux disease)     Grade II hemorrhoids     H/O arthroscopy of left knee     x3    History of arthroscopy of right knee     x3    Hypertension     Iron deficiency anemia     Kidney stones     pt states has had 33 kidney stones    Mantle cell lymphoma of extranodal and solid organ sites [facility])     colon    Sleep apnea     mild no c-pap needed       Past Surgical History:        Procedure Laterality Date    CARDIAC SURGERY      CHOLECYSTECTOMY      lap choley    COLONOSCOPY      HERNIA REPAIR  laporoscopic    umbilical with mesh    JOINT REPLACEMENT      bilateral knees    KNEE ARTHROSCOPY Bilateral     right x 3,left x3    MITRAL VALVE REPAIR      PROSTATECTOMY N/A 12/10/13    suprapubic prostatectomy/open bladder stone removal    SHOULDER ARTHROSCOPY         Social History:    Social History     Tobacco Use    Smoking status: Never Smoker    Smokeless tobacco: Never Used   Substance Use Topics    Alcohol use: Yes     Alcohol/week: 0.8 standard drinks     Types: 1 Standard drinks or equivalent per week     Comment: 2-3 drinks per year                                Counseling given: Not Answered      Vital Signs (Current):   Vitals:    02/02/22 1007 02/08/22 1141   BP:  122/74   Pulse:  95   Resp:  20   Temp:  99.1 °F (37.3 °C)   TempSrc:  Temporal   SpO2:  92%   Weight: 230 lb (104.3 kg) 230 lb (104.3 kg)   Height: 6' (1.829 m) 6' (1.829 m)                                              BP Readings from Last 3 Encounters:   02/08/22 122/74   01/25/22 (!) 143/87   08/03/11 110/82       NPO Status:                                                                                 BMI:   Wt Readings from Last 3 Encounters:   02/08/22 230 lb (104.3 kg)   01/20/22 214 lb 15.2 oz (97.5 kg)   12/09/13 215 lb (97.5 kg)     Body mass index is 31.19 kg/m². CBC:   Lab Results   Component Value Date    WBC 22.4 01/25/2022    RBC 3.44 01/25/2022    HGB 10.0 01/25/2022    HCT 31.4 01/25/2022    MCV 91.1 01/25/2022    RDW 17.9 01/25/2022     01/25/2022       CMP:   Lab Results   Component Value Date     01/25/2022    K 4.3 01/25/2022    K 4.2 01/21/2022     01/25/2022    CO2 27 01/25/2022    BUN 17 01/25/2022    CREATININE 1.3 01/25/2022    GFRAA >60 01/25/2022    LABGLOM 53 01/25/2022    GLUCOSE 87 01/25/2022    PROT 5.7 01/19/2022    CALCIUM 9.1 01/25/2022    BILITOT <0.2 01/19/2022    ALKPHOS 94 01/19/2022    AST 53 01/19/2022    ALT 21 01/19/2022       POC Tests: No results for input(s): POCGLU, POCNA, POCK, POCCL, POCBUN, POCHEMO, POCHCT in the last 72 hours.     Coags:   Lab Results   Component Value Date    PROTIME 11.2 12/10/2013    INR 1.00 12/10/2013    APTT 30.0 12/10/2013       HCG (If Applicable): No results found for: PREGTESTUR, PREGSERUM, HCG, HCGQUANT     ABGs: No results found for: PHART, PO2ART, FQI4XGL, RKK9OAV, BEART, H2AINZHV     Type & Screen (If Applicable):  No results found for: LABABO, LABRH    Drug/Infectious Status (If Applicable):  No results found for: HIV, HEPCAB    COVID-19 Screening (If Applicable):   Lab Results   Component Value Date    COVID19 Not Detected 02/04/2022           Anesthesia Evaluation  Patient summary reviewed and Nursing notes reviewed no history of anesthetic complications:   Airway: Mallampati: III     Neck ROM: full   Dental:          Pulmonary:Negative Pulmonary ROS and normal exam    (+) sleep apnea:                             Cardiovascular:Negative CV ROS    (+) hypertension:, valvular problems/murmurs (s/p valve surgery):, CAD (denies):, CABG/stent:,     (-)  angina                Neuro/Psych:   Negative Neuro/Psych ROS              GI/Hepatic/Renal: Neg GI/Hepatic/Renal ROS  (+) GERD: well controlled, renal disease: kidney stones,      (-) hiatal hernia       Endo/Other: Negative Endo/Other ROS                    Abdominal:             Vascular: Other Findings:           Anesthesia Plan      general     ASA 3     (I discussed with the patient the risks and benefits of PIV, general anesthesia, IV Narcotics, PACU. All questions were answered the patient agrees with the plan and wishes to proceed.  )  Induction: intravenous. Pre-Operative Diagnosis: Bile salt-induced diarrhea [K90.89]    80 y.o.   BMI:  Body mass index is 31.19 kg/m².      Vitals:    02/02/22 1007 02/08/22 1141   BP:  122/74   Pulse:  95   Resp:  20   Temp:  99.1 °F (37.3 °C)   TempSrc:  Temporal   SpO2:  92%   Weight: 230 lb (104.3 kg) 230 lb (104.3 kg)   Height: 6' (1.829 m) 6' (1.829 m)       Allergies   Allergen Reactions    Butorphanol Other (See Comments) and Rash     Shakes,tremors-severe  Extreme tremors  Shakes,tremors-severe  Extreme tremors  BODY SHAKING         Social History     Tobacco Use    Smoking status: Never Smoker    Smokeless tobacco: Never Used   Substance Use Topics    Alcohol use:  Yes     Alcohol/week: 0.8 standard drinks     Types: 1 Standard drinks or equivalent per week     Comment: 2-3 drinks per year       LABS:    CBC  Lab Results   Component Value Date/Time    WBC 22.4 (H) 01/25/2022 06:38 AM    HGB 10.0 (L) 01/25/2022 06:38 AM    HCT 31.4 (L) 01/25/2022 06:38 AM     (L) 01/25/2022 06:38 AM     RENAL  Lab Results   Component Value Date/Time     01/25/2022 06:38 AM    K 4.3 01/25/2022 06:38 AM    K 4.2 01/21/2022 06:57 AM     01/25/2022 06:38 AM    CO2 27 01/25/2022 06:38 AM    BUN 17 01/25/2022 06:38 AM    CREATININE 1.3 01/25/2022 06:38 AM    GLUCOSE 87 01/25/2022 06:38 AM     COAGS  Lab Results   Component Value Date/Time    PROTIME 11.2 12/10/2013 08:32 AM    INR 1.00 12/10/2013 08:32 AM    APTT 30.0 12/10/2013 08:32 AM         Dennis Nelson MD   2/8/2022

## 2022-02-08 NOTE — PROGRESS NOTES
I spoke to dr Kulwinder Bearden about respiratory staus, he said to encourage deep breaths and coughs and try to decrease o2 per nasal cannula slowly and see how he tolerates to keep o2 level above 90

## (undated) DEVICE — FORCEPS BX L240CM JAW DIA2.8MM L CAP W/ NDL MIC MESH TOOTH

## (undated) DEVICE — FORMALIN  10%NBF 20ML PREFLL CONT

## (undated) DEVICE — MASK CAPNOGRAPHY AD W35IN DIA58IN SAMP LN L10FT O2 LN

## (undated) DEVICE — CONMED SCOPE SAVER BITE BLOCK, 20X27 MM: Brand: SCOPE SAVER

## (undated) DEVICE — ENDOSCOPIC KIT 2 12 FT OP4 DE2 GWN SYR

## (undated) DEVICE — ELECTRODE,ECG,STRESS,FOAM,3PK: Brand: MEDLINE